# Patient Record
Sex: FEMALE | Race: WHITE | NOT HISPANIC OR LATINO | Employment: OTHER | URBAN - METROPOLITAN AREA
[De-identification: names, ages, dates, MRNs, and addresses within clinical notes are randomized per-mention and may not be internally consistent; named-entity substitution may affect disease eponyms.]

---

## 2017-09-12 ENCOUNTER — HISTORICAL (OUTPATIENT)
Dept: RADIOLOGY | Facility: HOSPITAL | Age: 62
End: 2017-09-12

## 2020-08-31 ENCOUNTER — HISTORICAL (OUTPATIENT)
Dept: RADIOLOGY | Facility: HOSPITAL | Age: 65
End: 2020-08-31

## 2022-05-19 NOTE — PROGRESS NOTES
Subjective:       Patient ID: Helen Stephen is a 66 y.o. female.    Chief Complaint: No chief complaint on file.      Past Medical History:   Diagnosis Date    DM (diabetes mellitus)     HTN (hypertension)     Pulmonary nodules      No past surgical history on file.  No family history on file.  Social History     Socioeconomic History    Marital status:        No current outpatient medications on file.     No current facility-administered medications for this visit.     Review of patient's allergies indicates:  No Known Allergies    Review of Systems    Objective:      There were no vitals filed for this visit.  Physical Exam    Lab Review: CBC: No results found for: WBC, RBC, HGB, HCT, PLT  Diagnostics Review: ECG: Reviewed     Assessment:       No diagnosis found.    Plan:

## 2022-05-19 NOTE — PROGRESS NOTES
History & Physical    CHIEF COMPLAINT:  RIGHT BREAST MUCINOUS CARCINOMA     History of Present Illness:  66 year-old-female referred by Dr. Sang Rodriguez who underwent routine screening mammogram showing a 1.0 cm focal asymmetry at 12 o'clock, 4 cm from the nipple, in the right breast suspicious for malignancy.  Biopsy of the mass was performed, pathology revealed mucinous carcinoma, low grade.      Past Medical History:   Diagnosis Date    DM (diabetes mellitus)     HTN (hypertension)     Pulmonary nodules      No past surgical history on file.     No family history on file.      ALLERGIES:  No known allergies    MEDICATIONS:  Enalapril  Premarin  Xanax  Metformin    Review of Systems:  Review of Systems   Constitutional: Negative for appetite change, chills, diaphoresis and fever.   HENT: Negative for congestion, drooling, ear discharge, ear pain and hearing loss.    Eyes: Negative for discharge.   Respiratory: Negative for apnea, cough, choking, chest tightness, shortness of breath and stridor.    Cardiovascular: Negative for chest pain, palpitations and leg swelling.   Endocrine: Negative for cold intolerance and heat intolerance.   Genitourinary: Negative for difficulty urinating, dyspareunia, dysuria and hematuria.   Musculoskeletal: Negative for arthralgias, gait problem and joint swelling.   Skin: Negative for color change and rash.   Neurological: Negative for dizziness, tremors, seizures, syncope, facial asymmetry, speech difficulty, light-headedness, numbness and headaches.   Psychiatric/Behavioral: Negative for agitation and confusion.       OBJECTIVE:     Vital Signs (Most Recent)              Physical Exam:  Physical Exam  Vitals and nursing note reviewed. Exam conducted with a chaperone present.   Constitutional:       General: She is not in acute distress.     Appearance: Normal appearance. She is not ill-appearing, toxic-appearing or diaphoretic.   HENT:      Head: Normocephalic and atraumatic.       Nose: Nose normal.      Mouth/Throat:      Mouth: Mucous membranes are moist.      Pharynx: Oropharynx is clear.   Eyes:      General: No scleral icterus.     Extraocular Movements: Extraocular movements intact.      Pupils: Pupils are equal, round, and reactive to light.   Neck:      Vascular: No carotid bruit.   Cardiovascular:      Rate and Rhythm: Normal rate and regular rhythm.      Pulses: Normal pulses.      Heart sounds: Normal heart sounds. No murmur heard.  Pulmonary:      Effort: Pulmonary effort is normal.      Breath sounds: Normal breath sounds. No stridor. No wheezing.   Chest:   Breasts:      Right: Normal. No swelling, bleeding, inverted nipple, mass, nipple discharge, skin change, tenderness, axillary adenopathy or supraclavicular adenopathy.      Left: Normal. No swelling, bleeding, inverted nipple, mass, nipple discharge, skin change, tenderness, axillary adenopathy or supraclavicular adenopathy.       Abdominal:      General: Abdomen is flat. Bowel sounds are normal. There is no distension.      Palpations: Abdomen is soft. There is no mass.      Tenderness: There is no abdominal tenderness. There is no right CVA tenderness, left CVA tenderness, guarding or rebound.      Hernia: No hernia is present.   Musculoskeletal:         General: No swelling, tenderness, deformity or signs of injury. Normal range of motion.      Cervical back: Normal range of motion and neck supple. No rigidity or tenderness.      Right lower leg: No edema.      Left lower leg: No edema.   Lymphadenopathy:      Cervical: No cervical adenopathy.      Upper Body:      Right upper body: No supraclavicular or axillary adenopathy.      Left upper body: No supraclavicular or axillary adenopathy.   Skin:     General: Skin is warm.      Capillary Refill: Capillary refill takes less than 2 seconds.      Coloration: Skin is not jaundiced or pale.      Findings: No bruising, erythema, lesion or rash.   Neurological:       General: No focal deficit present.      Mental Status: She is alert and oriented to person, place, and time.      Sensory: No sensory deficit.      Motor: No weakness.      Coordination: Coordination normal.      Gait: Gait normal.   Psychiatric:         Mood and Affect: Mood normal.         Behavior: Behavior normal.         Judgment: Judgment normal.             ASSESSMENT/PLAN:     Diagnosis, staging, prognosis and treatment guidelines for breast cancer were discussed with the patient and her  in detail.Using visual aids and drawings, I described the anatomy and potential surgical procedures.  She is interested in breast conservation therapy is a good candidate for this.  Schedule for ultrasound-guided right breast lumpectomy, sentinel lymph node mapping and biopsy.The risks and benefits of the procedure were explained in detail, questions were addressed, the patient gives consent to proceed

## 2022-05-20 ENCOUNTER — OFFICE VISIT (OUTPATIENT)
Dept: SURGICAL ONCOLOGY | Facility: CLINIC | Age: 67
End: 2022-05-20
Payer: MEDICAID

## 2022-05-20 VITALS
BODY MASS INDEX: 35.02 KG/M2 | HEART RATE: 73 BPM | WEIGHT: 197.63 LBS | SYSTOLIC BLOOD PRESSURE: 139 MMHG | DIASTOLIC BLOOD PRESSURE: 67 MMHG | HEIGHT: 63 IN

## 2022-05-20 DIAGNOSIS — C50.911 MUCINOUS CARCINOMA OF RIGHT BREAST: Primary | ICD-10-CM

## 2022-05-20 DIAGNOSIS — C50.911 MUCINOUS CARCINOMA OF RIGHT BREAST: ICD-10-CM

## 2022-05-20 DIAGNOSIS — C50.411 MALIGNANT NEOPLASM OF UPPER-OUTER QUADRANT OF RIGHT FEMALE BREAST, UNSPECIFIED ESTROGEN RECEPTOR STATUS: Primary | ICD-10-CM

## 2022-05-20 PROCEDURE — 3075F SYST BP GE 130 - 139MM HG: CPT | Mod: CPTII,,, | Performed by: SURGERY

## 2022-05-20 PROCEDURE — 99213 OFFICE O/P EST LOW 20 MIN: CPT | Mod: PBBFAC | Performed by: SURGERY

## 2022-05-20 PROCEDURE — 3078F PR MOST RECENT DIASTOLIC BLOOD PRESSURE < 80 MM HG: ICD-10-PCS | Mod: CPTII,,, | Performed by: SURGERY

## 2022-05-20 PROCEDURE — 3075F PR MOST RECENT SYSTOLIC BLOOD PRESS GE 130-139MM HG: ICD-10-PCS | Mod: CPTII,,, | Performed by: SURGERY

## 2022-05-20 PROCEDURE — 1159F PR MEDICATION LIST DOCUMENTED IN MEDICAL RECORD: ICD-10-PCS | Mod: CPTII,,, | Performed by: SURGERY

## 2022-05-20 PROCEDURE — 99205 PR OFFICE/OUTPT VISIT, NEW, LEVL V, 60-74 MIN: ICD-10-PCS | Mod: S$PBB,,, | Performed by: SURGERY

## 2022-05-20 PROCEDURE — 4010F ACE/ARB THERAPY RXD/TAKEN: CPT | Mod: CPTII,,, | Performed by: SURGERY

## 2022-05-20 PROCEDURE — 3078F DIAST BP <80 MM HG: CPT | Mod: CPTII,,, | Performed by: SURGERY

## 2022-05-20 PROCEDURE — 3008F BODY MASS INDEX DOCD: CPT | Mod: CPTII,,, | Performed by: SURGERY

## 2022-05-20 PROCEDURE — 3008F PR BODY MASS INDEX (BMI) DOCUMENTED: ICD-10-PCS | Mod: CPTII,,, | Performed by: SURGERY

## 2022-05-20 PROCEDURE — 99999 PR PBB SHADOW E&M-EST. PATIENT-LVL III: ICD-10-PCS | Mod: PBBFAC,,, | Performed by: SURGERY

## 2022-05-20 PROCEDURE — 4010F PR ACE/ARB THEARPY RXD/TAKEN: ICD-10-PCS | Mod: CPTII,,, | Performed by: SURGERY

## 2022-05-20 PROCEDURE — 99999 PR PBB SHADOW E&M-EST. PATIENT-LVL III: CPT | Mod: PBBFAC,,, | Performed by: SURGERY

## 2022-05-20 PROCEDURE — 99205 OFFICE O/P NEW HI 60 MIN: CPT | Mod: S$PBB,,, | Performed by: SURGERY

## 2022-05-20 PROCEDURE — 1159F MED LIST DOCD IN RCRD: CPT | Mod: CPTII,,, | Performed by: SURGERY

## 2022-05-20 RX ORDER — ASPIRIN 81 MG/1
81 TABLET ORAL DAILY
COMMUNITY

## 2022-05-20 RX ORDER — ESTRADIOL 1 MG/1
1 TABLET ORAL DAILY
COMMUNITY
Start: 2022-04-05 | End: 2022-09-01

## 2022-05-20 RX ORDER — AMLODIPINE AND BENAZEPRIL HYDROCHLORIDE 10; 20 MG/1; MG/1
1 CAPSULE ORAL DAILY
COMMUNITY
Start: 2022-03-21

## 2022-05-20 RX ORDER — PIOGLITAZONEHYDROCHLORIDE 30 MG/1
30 TABLET ORAL DAILY
COMMUNITY
Start: 2022-03-06

## 2022-05-20 RX ORDER — SITAGLIPTIN AND METFORMIN HYDROCHLORIDE 1000; 50 MG/1; MG/1
1 TABLET, FILM COATED ORAL 2 TIMES DAILY
COMMUNITY
Start: 2022-03-06

## 2022-05-20 RX ORDER — GLIMEPIRIDE 4 MG/1
4 TABLET ORAL 2 TIMES DAILY
COMMUNITY
Start: 2022-04-08

## 2022-05-20 RX ORDER — ENOXAPARIN SODIUM 100 MG/ML
30 INJECTION SUBCUTANEOUS EVERY 24 HOURS
Status: CANCELLED | OUTPATIENT
Start: 2022-05-20

## 2022-05-20 RX ORDER — ALPRAZOLAM 0.5 MG/1
0.5 TABLET ORAL 2 TIMES DAILY
COMMUNITY
Start: 2022-03-15

## 2022-05-20 RX ORDER — LEVOTHYROXINE SODIUM 50 UG/1
50 TABLET ORAL DAILY
COMMUNITY
Start: 2022-03-03

## 2022-05-20 RX ORDER — ATENOLOL 50 MG/1
50 TABLET ORAL NIGHTLY
COMMUNITY
Start: 2022-03-03

## 2022-05-20 RX ORDER — SIMVASTATIN 40 MG/1
40 TABLET, FILM COATED ORAL DAILY
COMMUNITY
Start: 2022-03-03

## 2022-05-24 ENCOUNTER — HOSPITAL ENCOUNTER (OUTPATIENT)
Dept: RADIOLOGY | Facility: HOSPITAL | Age: 67
Discharge: HOME | End: 2022-05-24
Attending: SURGERY
Payer: MEDICAID

## 2022-05-24 DIAGNOSIS — C50.911 MUCINOUS CARCINOMA OF RIGHT BREAST: ICD-10-CM

## 2022-05-24 DIAGNOSIS — C50.911 MUCINOUS CARCINOMA OF RIGHT BREAST: Primary | ICD-10-CM

## 2022-05-24 PROCEDURE — 71046 X-RAY EXAM CHEST 2 VIEWS: CPT | Mod: TC

## 2022-05-25 ENCOUNTER — DOCUMENTATION ONLY (OUTPATIENT)
Dept: SURGICAL ONCOLOGY | Facility: CLINIC | Age: 67
End: 2022-05-25
Payer: MEDICAID

## 2022-05-25 NOTE — PROGRESS NOTES
Referral faxed manually to [facility] for ABN Pre Op EKG. They are to contact patient with appointment date and time.    P# [phone]  F# [phone]

## 2022-05-25 NOTE — PROGRESS NOTES
[facility] called and stated that they do not take patient's insurance. Referral faxed manually to [facility]. They are to contact patient with appointment date and time.    P# [phone]  F# [phone]

## 2022-06-01 DIAGNOSIS — C50.919 BREAST CANCER: ICD-10-CM

## 2022-06-01 DIAGNOSIS — C50.911 MUCINOUS CARCINOMA OF RIGHT BREAST: Primary | ICD-10-CM

## 2022-06-01 RX ORDER — ENOXAPARIN SODIUM 100 MG/ML
30 INJECTION SUBCUTANEOUS EVERY 24 HOURS
Status: CANCELLED | OUTPATIENT
Start: 2022-06-01

## 2022-06-05 RX ORDER — CHOLECALCIFEROL (VITAMIN D3) 25 MCG
1000 TABLET ORAL DAILY
COMMUNITY

## 2022-06-07 ENCOUNTER — ANESTHESIA EVENT (OUTPATIENT)
Dept: SURGERY | Facility: HOSPITAL | Age: 67
End: 2022-06-07
Payer: MEDICAID

## 2022-06-07 DIAGNOSIS — C50.911 MUCINOUS CARCINOMA OF RIGHT BREAST: Primary | ICD-10-CM

## 2022-06-08 ENCOUNTER — HOSPITAL ENCOUNTER (OUTPATIENT)
Dept: RADIOLOGY | Facility: HOSPITAL | Age: 67
Discharge: HOME | End: 2022-06-08
Payer: MEDICAID

## 2022-06-08 ENCOUNTER — HOSPITAL ENCOUNTER (OUTPATIENT)
Facility: HOSPITAL | Age: 67
Discharge: HOME | End: 2022-06-08
Attending: INTERNAL MEDICINE | Admitting: INTERNAL MEDICINE
Payer: MEDICAID

## 2022-06-08 ENCOUNTER — ANESTHESIA (OUTPATIENT)
Dept: SURGERY | Facility: HOSPITAL | Age: 67
End: 2022-06-08
Payer: MEDICAID

## 2022-06-08 VITALS
HEART RATE: 57 BPM | HEIGHT: 63 IN | TEMPERATURE: 97 F | WEIGHT: 197.75 LBS | RESPIRATION RATE: 16 BRPM | BODY MASS INDEX: 35.04 KG/M2 | DIASTOLIC BLOOD PRESSURE: 63 MMHG | SYSTOLIC BLOOD PRESSURE: 115 MMHG | OXYGEN SATURATION: 96 %

## 2022-06-08 DIAGNOSIS — C50.919 BREAST CANCER: ICD-10-CM

## 2022-06-08 DIAGNOSIS — C50.911 MUCINOUS CARCINOMA OF RIGHT BREAST: ICD-10-CM

## 2022-06-08 LAB — POCT GLUCOSE: 210 MG/DL (ref 70–110)

## 2022-06-08 PROCEDURE — 71000033 HC RECOVERY, INTIAL HOUR: Performed by: SURGERY

## 2022-06-08 PROCEDURE — 36000706: Performed by: SURGERY

## 2022-06-08 PROCEDURE — A9541 TC99M SULFUR COLLOID: HCPCS

## 2022-06-08 PROCEDURE — 71000015 HC POSTOP RECOV 1ST HR: Performed by: SURGERY

## 2022-06-08 PROCEDURE — 19301 PR MASTECTOMY, PARTIAL: ICD-10-PCS | Mod: RT,,, | Performed by: SURGERY

## 2022-06-08 PROCEDURE — 37000009 HC ANESTHESIA EA ADD 15 MINS: Performed by: SURGERY

## 2022-06-08 PROCEDURE — 37000008 HC ANESTHESIA 1ST 15 MINUTES: Performed by: SURGERY

## 2022-06-08 PROCEDURE — 25000003 PHARM REV CODE 250: Performed by: NURSE ANESTHETIST, CERTIFIED REGISTERED

## 2022-06-08 PROCEDURE — 71000016 HC POSTOP RECOV ADDL HR: Performed by: SURGERY

## 2022-06-08 PROCEDURE — 38900 PR INTRAOPERATIVE SENTINEL LYMPH NODE ID W DYE INJECTION: ICD-10-PCS | Mod: RT,,, | Performed by: SURGERY

## 2022-06-08 PROCEDURE — 63600175 PHARM REV CODE 636 W HCPCS: Performed by: ANESTHESIOLOGY

## 2022-06-08 PROCEDURE — 63600175 PHARM REV CODE 636 W HCPCS: Performed by: NURSE ANESTHETIST, CERTIFIED REGISTERED

## 2022-06-08 PROCEDURE — 19301 PARTIAL MASTECTOMY: CPT | Mod: RT,,, | Performed by: SURGERY

## 2022-06-08 PROCEDURE — 38525 PR BIOPSY/REM LYMPH NODES, AXILLARY: ICD-10-PCS | Mod: 51,RT,, | Performed by: SURGERY

## 2022-06-08 PROCEDURE — 01610 ANES ALL PX NRV MUSC SHO&AX: CPT | Performed by: SURGERY

## 2022-06-08 PROCEDURE — 36000707: Performed by: SURGERY

## 2022-06-08 PROCEDURE — 82962 GLUCOSE BLOOD TEST: CPT | Performed by: SURGERY

## 2022-06-08 PROCEDURE — 27201423 OPTIME MED/SURG SUP & DEVICES STERILE SUPPLY: Performed by: SURGERY

## 2022-06-08 PROCEDURE — 25000003 PHARM REV CODE 250: Performed by: SURGERY

## 2022-06-08 PROCEDURE — 76998 PR  ULTRASONIC GUIDANCE, INTRAOPERATIVE: ICD-10-PCS | Mod: 26,,, | Performed by: SURGERY

## 2022-06-08 PROCEDURE — 38792 PR IDENTIFY SENTINEL 2DE: ICD-10-PCS | Mod: 59,RT,, | Performed by: SURGERY

## 2022-06-08 PROCEDURE — 38792 RA TRACER ID OF SENTINL NODE: CPT | Mod: 59,RT,, | Performed by: SURGERY

## 2022-06-08 PROCEDURE — 76998 US GUIDE INTRAOP: CPT | Mod: 26,,, | Performed by: SURGERY

## 2022-06-08 PROCEDURE — 38525 BIOPSY/REMOVAL LYMPH NODES: CPT | Mod: 51,RT,, | Performed by: SURGERY

## 2022-06-08 PROCEDURE — 38900 IO MAP OF SENT LYMPH NODE: CPT | Mod: RT,,, | Performed by: SURGERY

## 2022-06-08 PROCEDURE — 63600175 PHARM REV CODE 636 W HCPCS: Performed by: SURGERY

## 2022-06-08 PROCEDURE — 63600175 PHARM REV CODE 636 W HCPCS

## 2022-06-08 RX ORDER — SODIUM CHLORIDE, SODIUM LACTATE, POTASSIUM CHLORIDE, CALCIUM CHLORIDE 600; 310; 30; 20 MG/100ML; MG/100ML; MG/100ML; MG/100ML
INJECTION, SOLUTION INTRAVENOUS CONTINUOUS
Status: DISCONTINUED | OUTPATIENT
Start: 2022-06-08 | End: 2022-06-08 | Stop reason: HOSPADM

## 2022-06-08 RX ORDER — DIPHENHYDRAMINE HYDROCHLORIDE 50 MG/ML
12.5 INJECTION INTRAMUSCULAR; INTRAVENOUS ONCE
Status: DISCONTINUED | OUTPATIENT
Start: 2022-06-08 | End: 2022-06-08 | Stop reason: HOSPADM

## 2022-06-08 RX ORDER — HYDROCODONE BITARTRATE AND ACETAMINOPHEN 5; 325 MG/1; MG/1
1 TABLET ORAL EVERY 6 HOURS PRN
Qty: 18 TABLET | Refills: 0 | Status: SHIPPED | OUTPATIENT
Start: 2022-06-08

## 2022-06-08 RX ORDER — FENTANYL CITRATE 50 UG/ML
INJECTION, SOLUTION INTRAMUSCULAR; INTRAVENOUS
Status: DISCONTINUED | OUTPATIENT
Start: 2022-06-08 | End: 2022-06-08

## 2022-06-08 RX ORDER — SODIUM CHLORIDE, SODIUM GLUCONATE, SODIUM ACETATE, POTASSIUM CHLORIDE AND MAGNESIUM CHLORIDE 30; 37; 368; 526; 502 MG/100ML; MG/100ML; MG/100ML; MG/100ML; MG/100ML
1000 INJECTION, SOLUTION INTRAVENOUS CONTINUOUS
Status: DISCONTINUED | OUTPATIENT
Start: 2022-06-08 | End: 2022-06-08 | Stop reason: HOSPADM

## 2022-06-08 RX ORDER — BUPIVACAINE HYDROCHLORIDE 2.5 MG/ML
INJECTION, SOLUTION EPIDURAL; INFILTRATION; INTRACAUDAL
Status: DISCONTINUED
Start: 2022-06-08 | End: 2022-06-08 | Stop reason: HOSPADM

## 2022-06-08 RX ORDER — LIDOCAINE HYDROCHLORIDE 10 MG/ML
INJECTION, SOLUTION EPIDURAL; INFILTRATION; INTRACAUDAL; PERINEURAL
Status: DISCONTINUED | OUTPATIENT
Start: 2022-06-08 | End: 2022-06-08

## 2022-06-08 RX ORDER — DIPHENHYDRAMINE HYDROCHLORIDE 50 MG/ML
INJECTION INTRAMUSCULAR; INTRAVENOUS
Status: DISCONTINUED
Start: 2022-06-08 | End: 2022-06-08 | Stop reason: HOSPADM

## 2022-06-08 RX ORDER — CEFAZOLIN SODIUM 1 G/3ML
INJECTION, POWDER, FOR SOLUTION INTRAMUSCULAR; INTRAVENOUS
Status: COMPLETED
Start: 2022-06-08 | End: 2022-06-08

## 2022-06-08 RX ORDER — ONDANSETRON 2 MG/ML
4 INJECTION INTRAMUSCULAR; INTRAVENOUS DAILY PRN
Status: DISCONTINUED | OUTPATIENT
Start: 2022-06-08 | End: 2022-06-08 | Stop reason: HOSPADM

## 2022-06-08 RX ORDER — PROPOFOL 10 MG/ML
VIAL (ML) INTRAVENOUS
Status: DISCONTINUED | OUTPATIENT
Start: 2022-06-08 | End: 2022-06-08

## 2022-06-08 RX ORDER — MIDAZOLAM HYDROCHLORIDE 1 MG/ML
2 INJECTION INTRAMUSCULAR; INTRAVENOUS ONCE AS NEEDED
Status: COMPLETED | OUTPATIENT
Start: 2022-06-08 | End: 2022-06-08

## 2022-06-08 RX ORDER — CEFAZOLIN SODIUM 2 G/50ML
2 SOLUTION INTRAVENOUS
Status: DISCONTINUED | OUTPATIENT
Start: 2022-06-08 | End: 2022-06-08 | Stop reason: HOSPADM

## 2022-06-08 RX ORDER — ENOXAPARIN SODIUM 100 MG/ML
30 INJECTION SUBCUTANEOUS EVERY 24 HOURS
Status: DISCONTINUED | OUTPATIENT
Start: 2022-06-08 | End: 2022-06-08 | Stop reason: HOSPADM

## 2022-06-08 RX ORDER — BUPIVACAINE HYDROCHLORIDE 2.5 MG/ML
INJECTION, SOLUTION EPIDURAL; INFILTRATION; INTRACAUDAL
Status: DISCONTINUED | OUTPATIENT
Start: 2022-06-08 | End: 2022-06-08 | Stop reason: HOSPADM

## 2022-06-08 RX ORDER — MIDAZOLAM HYDROCHLORIDE 1 MG/ML
INJECTION INTRAMUSCULAR; INTRAVENOUS
Status: COMPLETED
Start: 2022-06-08 | End: 2022-06-08

## 2022-06-08 RX ORDER — ISOSULFAN BLUE 50 MG/5ML
INJECTION, SOLUTION SUBCUTANEOUS
Status: DISCONTINUED | OUTPATIENT
Start: 2022-06-08 | End: 2022-06-08 | Stop reason: HOSPADM

## 2022-06-08 RX ORDER — HYDROMORPHONE HYDROCHLORIDE 2 MG/ML
0.4 INJECTION, SOLUTION INTRAMUSCULAR; INTRAVENOUS; SUBCUTANEOUS EVERY 5 MIN PRN
Status: DISCONTINUED | OUTPATIENT
Start: 2022-06-08 | End: 2022-06-08 | Stop reason: HOSPADM

## 2022-06-08 RX ORDER — ACETAMINOPHEN 10 MG/ML
INJECTION, SOLUTION INTRAVENOUS
Status: DISCONTINUED | OUTPATIENT
Start: 2022-06-08 | End: 2022-06-08

## 2022-06-08 RX ORDER — ISOSULFAN BLUE 50 MG/5ML
INJECTION, SOLUTION SUBCUTANEOUS
Status: DISCONTINUED
Start: 2022-06-08 | End: 2022-06-08 | Stop reason: HOSPADM

## 2022-06-08 RX ORDER — ENOXAPARIN SODIUM 100 MG/ML
INJECTION SUBCUTANEOUS
Status: DISCONTINUED
Start: 2022-06-08 | End: 2022-06-08 | Stop reason: HOSPADM

## 2022-06-08 RX ORDER — MEPERIDINE HYDROCHLORIDE 25 MG/ML
12.5 INJECTION INTRAMUSCULAR; INTRAVENOUS; SUBCUTANEOUS ONCE AS NEEDED
Status: DISCONTINUED | OUTPATIENT
Start: 2022-06-08 | End: 2022-06-08 | Stop reason: HOSPADM

## 2022-06-08 RX ORDER — BUPIVACAINE HYDROCHLORIDE 2.5 MG/ML
INJECTION, SOLUTION EPIDURAL; INFILTRATION; INTRACAUDAL
Status: DISCONTINUED
Start: 2022-06-08 | End: 2022-06-08 | Stop reason: WASHOUT

## 2022-06-08 RX ORDER — CEFAZOLIN SODIUM 1 G/3ML
INJECTION, POWDER, FOR SOLUTION INTRAMUSCULAR; INTRAVENOUS
Status: DISCONTINUED | OUTPATIENT
Start: 2022-06-08 | End: 2022-06-08

## 2022-06-08 RX ADMIN — HYDROMORPHONE HYDROCHLORIDE 0.4 MG: 2 INJECTION, SOLUTION INTRAMUSCULAR; INTRAVENOUS; SUBCUTANEOUS at 09:06

## 2022-06-08 RX ADMIN — MIDAZOLAM HYDROCHLORIDE 2 MG: 1 INJECTION INTRAMUSCULAR; INTRAVENOUS at 07:06

## 2022-06-08 RX ADMIN — FENTANYL CITRATE 50 MCG: 50 INJECTION, SOLUTION INTRAMUSCULAR; INTRAVENOUS at 09:06

## 2022-06-08 RX ADMIN — SODIUM CHLORIDE, POTASSIUM CHLORIDE, SODIUM LACTATE AND CALCIUM CHLORIDE: 600; 310; 30; 20 INJECTION, SOLUTION INTRAVENOUS at 07:06

## 2022-06-08 RX ADMIN — ACETAMINOPHEN 1000 MG: 10 INJECTION, SOLUTION INTRAVENOUS at 08:06

## 2022-06-08 RX ADMIN — LIDOCAINE HYDROCHLORIDE 20 MG: 10 INJECTION, SOLUTION EPIDURAL; INFILTRATION; INTRACAUDAL; PERINEURAL at 08:06

## 2022-06-08 RX ADMIN — FENTANYL CITRATE 50 MCG: 50 INJECTION, SOLUTION INTRAMUSCULAR; INTRAVENOUS at 08:06

## 2022-06-08 RX ADMIN — PROPOFOL 150 MG: 10 INJECTION, EMULSION INTRAVENOUS at 08:06

## 2022-06-08 RX ADMIN — MIDAZOLAM HYDROCHLORIDE 2 MG: 1 INJECTION, SOLUTION INTRAMUSCULAR; INTRAVENOUS at 07:06

## 2022-06-08 RX ADMIN — CEFAZOLIN 2 G: 330 INJECTION, POWDER, FOR SOLUTION INTRAMUSCULAR; INTRAVENOUS at 07:06

## 2022-06-08 RX ADMIN — ONDANSETRON 4 MG: 2 INJECTION INTRAMUSCULAR; INTRAVENOUS at 09:06

## 2022-06-08 RX ADMIN — ENOXAPARIN SODIUM 30 MG: 30 INJECTION SUBCUTANEOUS at 06:06

## 2022-06-08 RX ADMIN — SODIUM CHLORIDE, POTASSIUM CHLORIDE, SODIUM LACTATE AND CALCIUM CHLORIDE: 600; 310; 30; 20 INJECTION, SOLUTION INTRAVENOUS at 08:06

## 2022-06-08 NOTE — OP NOTE
Dictation Op Note    Date of Surgery:  June 8, 2022    Surgeon:  Rony Osman MD    Assistant:  DIANE Faulkner                                         Pre-op Diagnosis:  Right breast cancer, 12 o'clock position    Post-op Diagnosis:  Same    Procedures:    1. Right breast Ultrasound-guided wire localization and lumpectomy  2. Right deep axillary sentinel lymph node excision/biopsy  3. sentinel lymph node mapping with neoprobe  4. injection of 1 mCi of radioactive sulfur colloid for sentinel lymph node mapping  5. injection of 3 cc of Lymphazurin blue dye for sentinel lymph node mapping   6. Re-excision of the medial, posterior/deep, and superior margins     Anesthesia:  GETA    EBL:  Less than 25 cc    Findings:  Intraoperative ultrasound was used to identify the previously placed marking clip and mass at the 12 o'clock position as well as perform intraoperative ultrasound guidance.  A representative image was saved in the media section of the EMR.  Four Burnham nodes were identified which were blue and hot. Margin Map was used and Specimen radiograph/ultrasound confirmed excision of the marking clip, and the superior, medial and posterior/deep margin were reexcised, otherwise adequate margins circumferentially.     Procedure in detail: After informed consent was obtained the patient was brought to the operating room placed in supine position.  General endotracheal anesthesia was administered without difficulty.  The patient's right breast was prepped and draped in sterile fashion.  I injected 1 mCi of radioactive sulfur colloid in the areolar dermis for sentinel lymph node mapping with neoprobe, injected 3 cc of Lymphazurin blue dye in the subareolar tissue for sentinel lymph node mapping.  The breast and axilla were prepped and draped in sterile fashion.  Using the neoprobe an area of increased radioactivity was noted.  Incision was made over this area carried down through the subcutaneous tissues and fascia,  into the deep axilla using the Bovie cautery.  Following blue stained lymphatic channels and using the neoprobe 4 deep axillary sentinel nodes were identified which were blue and hot.  .  Attention was then turned towards the breast.  Focused breast ultrasound was performed and the mass with associated marking clip were identified.  Under ultrasound guidance I performed wire localization.  Incision was made and the area around the wire was excised circumferentially.  The specimen was marked with suture long lateral and short superior. Margin Map was used and a specimen radiograph was performed in the operating room.  Based on this, the superior, medial and posterior margins were reexcised to 1 cm additional gross margins.  there were adequate radiographic margins circumferentially.  The wounds were irrigated with antimicrobial solution after meticulous hemostasis was achieved and closed in multiple layers with absorbable suture sterile dressings were applied.  The patient tolerated the procedure well.      Rony Osman MD  Surgical Oncology  Complex General, Gastrointestinal and Hepatobiliary Surgery

## 2022-06-08 NOTE — DISCHARGE INSTRUCTIONS
Patient Education       Lumpectomy Discharge Instructions   About this topic   Lumpectomy is the removal of a lump from your breast. The doctor removes only a part of the breast. The lab tests the lump to see if it is cancer. Your doctor will also take a small portion of healthy tissue around the lump. This is to make sure that all cancer or other abnormal tissue is removed. It is part of a treatment for early stage breast cancer.     What care is needed at home?   Wear a bra that gives you good support.  Incision care:  Be sure to wash your hands before and after touching your wound or dressing.  Remove bandage in 48 hours  You may take a shower in 48 hours. Wash your hands, wash your incision with soap and water, pat dry, and leave open to air.   No creams, oil, or lotions on incision.   No lifting or pulling anything over 10 pounds (4.5 kg)  Ask your doctor when it's okay to go back to normal activities  Place an ice pack wrapped in a towel over the painful part. Never put ice right on the skin. Do not leave the ice on more than 10 to 15 minutes at a time.   What follow-up care is needed?   Be sure to keep follow up visit.  Your doctor will talk with you about the tissue test result. Together you can make a plan if more care is needed.  If you have stitches or a drain, you will need to have them taken out. Your doctor will often want to do this in 1 to 2 weeks.  What drugs may be needed?   The doctor may order drugs to:  Help with pain  Prevent infection  Will physical activity be limited?   Rest is important. You need to avoid playing sports, swimming, and heavy lifting for a week or so after the surgery. Talk to your doctor about the right amount of activity for you.  What problems could happen?   Infection  Bleeding  Bruising  Scarring  Nerve damage  Cancer may come back  When do I need to call the doctor?   Signs of infection. These include a fever of 100.4°F (38°C) or higher, chills, cough, more sputum or  change in color of sputum, pain with passing urine, wound that will not heal, or pain.  Signs of wound infection. These include swelling, redness, warmth around the wound; too much pain when touched; yellowish, greenish, or bloody discharge; foul smell coming from the cut site; cut site opens up.  Very bad upset stomach and throwing up   Pain is not controlled by your medication

## 2022-06-08 NOTE — PROGRESS NOTES
Worsening of rash to left hand, some reddened area noted to chest.Dr Christianson notified, new order for benadryl given.

## 2022-06-08 NOTE — ANESTHESIA PROCEDURE NOTES
Intubation    Date/Time: 6/8/2022 8:03 AM  Performed by: Geri Lyons CRNA  Authorized by: Keshawn Christianson MD     Intubation:     Induction:  Intravenous    Intubated:  Postinduction    Mask Ventilation:  Easy with oral airway    Attempts:  1    Attempted By:  CRNA    Difficult Airway Encountered?: No      Airway Device:  Supraglottic airway/LMA    Airway Device Size:  4.0    Style/Cuff Inflation:  Cuffed (inflated to minimal occlusive pressure)    Placement Verified By:  Capnometry    Complicating Factors:  None    Findings Post-Intubation:  BS equal bilateral

## 2022-06-08 NOTE — PLAN OF CARE
VSS, viyk score   10   , pt arousable and ready for transfer to [facility] per Dr Christianson.

## 2022-06-08 NOTE — PLAN OF CARE
Problem: Fall Injury Risk  Goal: Absence of Fall and Fall-Related Injury  Outcome: Ongoing, Progressing  Intervention: Identify and Manage Contributors  Flowsheets (Taken 6/8/2022 1041)  Self-Care Promotion: independence encouraged  Intervention: Promote Injury-Free Environment  Flowsheets (Taken 6/8/2022 1041)  Safety Promotion/Fall Prevention:   assistive device/personal item within reach   Fall Risk reviewed with patient/family   family to remain at bedside   side rails raised x 2   supervised activity   instructed to call staff for mobility     Problem: Pain Acute  Goal: Acceptable Pain Control and Functional Ability  Outcome: Met  Intervention: Develop Pain Management Plan  Flowsheets (Taken 6/8/2022 1041)  Pain Management Interventions:   care clustered   position adjusted   quiet environment facilitated  Intervention: Prevent or Manage Pain  Flowsheets (Taken 6/8/2022 1041)  Sleep/Rest Enhancement:   awakenings minimized   family presence promoted   noise level reduced   room darkened  Sensory Stimulation Regulation:   auditory stimulation minimized   quiet environment promoted   visual stimulation minimized   care clustered  Bowel Elimination Promotion:   adequate fluid intake promoted   ambulation promoted

## 2022-06-08 NOTE — ANESTHESIA PREPROCEDURE EVALUATION
"                                                                                                             06/08/2022  Helen Stephen is a 66 y.o., female   Pre-operative evaluation for Procedure(s) (LRB):  ULTRASOUND GUIDED RIGHT BREAST LUMPECTOMY, WIRE LOC, SN BIOPSY, right (Right)    BP (!) 146/70   Pulse 63   Ht 5' 3" (1.6 m)   Wt 89.4 kg (197 lb)   LMP  (LMP Unknown) Comment: post menopausal  SpO2 98%   Breastfeeding No   BMI 34.90 kg/m²     There is no problem list on file for this patient.      Review of patient's allergies indicates:   Allergen Reactions    Adhesive tape-silicones        Current Outpatient Medications   Medication Instructions    ALPRAZolam (XANAX) 0.5 mg, Oral, 2 times daily    amlodipine-benazepril 10-20mg (LOTREL) 10-20 mg per capsule 1 capsule, Oral, Daily    aspirin (ECOTRIN) 81 mg, Oral, Daily    atenoloL (TENORMIN) 50 mg, Oral, Nightly    estradioL (ESTRACE) 1 mg, Oral, Daily    glimepiride (AMARYL) 4 mg, Oral, 2 times daily    JANUMET 50-1,000 mg per tablet 1 tablet, Oral, 2 times daily    levothyroxine (SYNTHROID) 50 mcg, Oral, Daily    pioglitazone (ACTOS) 30 mg, Oral, Daily    simvastatin (ZOCOR) 40 mg, Oral, Daily    vitamin D (VITAMIN D3) 1,000 Units, Oral, Daily       Past Surgical History:   Procedure Laterality Date    CHOLECYSTECTOMY      HYSTERECTOMY         Social History     Socioeconomic History    Marital status:    Tobacco Use    Smoking status: Never Smoker    Smokeless tobacco: Never Used   Substance and Sexual Activity    Alcohol use: Never    Drug use: Never       Lab Results   Component Value Date    WBC 12.6 (H) 05/24/2022    HGB 11.1 (L) 05/24/2022    HCT 38.3 05/24/2022    MCV 65.9 (L) 05/24/2022     05/24/2022          BMP  Lab Results   Component Value Date     05/24/2022    K 4.5 05/24/2022    CO2 27 05/24/2022    BUN 15.0 05/24/2022    CREATININE 0.73 05/24/2022    CALCIUM 9.6 05/24/2022    EGFRNONAA >60 " 05/24/2022          INR  No results for input(s): PT, INR, PROTIME, APTT in the last 72 hours.    No results found for: PREGTESTUR, PREGSERUM, HCG, HCGQUANT       Diagnostic Studies:      EKG:  Results for orders placed or performed in visit on 05/24/22   EKG 12-lead    Collection Time: 05/24/22  8:36 AM    Narrative    Test Reason : C50.911,    Vent. Rate : 065 BPM     Atrial Rate : 065 BPM     P-R Int : 224 ms          QRS Dur : 078 ms      QT Int : 432 ms       P-R-T Axes : 100 000 047 degrees     QTc Int : 449 ms    Sinus rhythm with 1st degree A-V block  Low voltage QRS  Abnormal ECG  Confirmed by Kayla Ragsdale MD ([id number]) on 5/24/2022 11:17:58 AM    Referred By: CLAIRE HIGHTOWER           Confirmed By:Kayla Ragsdale MD       2D Echo:  No results found for this or any previous visit..      Pre-op Assessment          Review of Systems  Anesthesia Hx:  No problems with previous Anesthesia  Denies Family Hx of Anesthesia complications.  Personal Hx of Anesthesia complications, Post-Operative Nausea/Vomiting   Cardiovascular:  Cardiovascular Normal  No Cardiac Complaints   Pulmonary:  Pulmonary Normal No Pulmonary Complaints   Hepatic/GI:   No Current GERD Sx       Physical Exam  General: Alert and Oriented  Obese  Airway:  Mallampati: III   Mouth Opening: Normal  TM Distance: Normal  Tongue: Normal  Neck ROM: Normal ROM    Dental:  Intact    Chest/Lungs:  Clear to auscultation, Normal Respiratory Rate    Heart:  Rate: Normal  Rhythm: Regular Rhythm        Anesthesia Plan  Type of Anesthesia, risks & benefits discussed:    Anesthesia Type: Gen Supraglottic Airway  Intra-op Monitoring Plan: Standard ASA Monitors  Post Op Pain Control Plan: multimodal analgesia  Induction:  IV and Inhalation  Airway Plan: Direct  Informed Consent: Informed consent signed with the Patient and all parties understand the risks and agree with anesthesia plan.  All questions answered. Patient consented to blood products? Yes  ASA Score:  2  Day of Surgery Review of History & Physical: H&P Update referred to the surgeon/provider.  Anesthesia Plan Notes: Disc possible need to convert to GETA    Discussed Anesthetic Plan w/ Pt/Family. Questions Entertained. Accepted.      Ready For Surgery From Anesthesia Perspective.     .

## 2022-06-08 NOTE — ANESTHESIA POSTPROCEDURE EVALUATION
Anesthesia Post Evaluation    Patient: Helen Stephen    Procedure(s) Performed: Procedure(s) (LRB):  ULTRASOUND GUIDED RIGHT BREAST LUMPECTOMY, WIRE LOC, SN BIOPSY, right (Right)    Final Anesthesia Type: general      Patient location during evaluation: floor  Patient participation: Yes- Able to Participate  Level of consciousness: awake  Post-procedure vital signs: reviewed and stable  Pain management: adequate  Airway patency: patent    PONV status at discharge: vomiting (controlled) and nausea (inadequately controlled)  Anesthetic complications: no      Cardiovascular status: hemodynamically stable  Respiratory status: spontaneous ventilation and unassisted  Hydration status: euvolemic  Follow-up not needed.  Comments:              Vitals Value Taken Time   /63 06/08/22 1115   Temp 36.2 °C (97.2 °F) 06/08/22 0930   Pulse 57 06/08/22 1115   Resp 16 06/08/22 1015   SpO2 96 % 06/08/22 1115         Event Time   Out of Recovery 10:12:15         Pain/Devonte Score: Pain Rating Prior to Med Admin: 5 (6/8/2022  9:50 AM)  Devonte Score: 10 (6/8/2022 10:15 AM)  Modified Devonte Score: 20 (6/8/2022 11:22 AM)

## 2022-06-08 NOTE — TRANSFER OF CARE
"Anesthesia Transfer of Care Note    Patient: Helen Stephen    Procedure(s) Performed: Procedure(s) (LRB):  ULTRASOUND GUIDED RIGHT BREAST LUMPECTOMY, WIRE LOC, SN BIOPSY, right (Right)    Patient location: PACU    Anesthesia Type: general    Transport from OR: Transported from OR on room air with adequate spontaneous ventilation    Post pain: adequate analgesia    Post assessment: no apparent anesthetic complications    Post vital signs: stable    Level of consciousness: responds to stimulation    Nausea/Vomiting: no nausea/vomiting    Complications: none    Transfer of care protocol was followed      Last vitals:   Visit Vitals  /60   Pulse 60   Temp 36.2 °C (97.2 °F)   Resp 15   Ht 5' 3" (1.6 m)   Wt 89.7 kg (197 lb 12 oz)   LMP  (LMP Unknown)   SpO2 97%   Breastfeeding No   BMI 35.03 kg/m²     "

## 2022-06-08 NOTE — PROGRESS NOTES
Pt c/o itching to right hand. Some redness noted, not raised. No other sites noted. No distress noted.

## 2022-06-08 NOTE — DISCHARGE SUMMARY
[facility] Surgical - Periop Services  Discharge Note  Short Stay    Procedure(s) (LRB):  ULTRASOUND GUIDED RIGHT BREAST LUMPECTOMY, WIRE LOC, SN BIOPSY, right (Right)    OUTCOME: Patient tolerated treatment/procedure well without complication and is now ready for discharge.    DISPOSITION: Home or Self Care    FINAL DIAGNOSIS:  <principal problem not specified>    FOLLOWUP: In clinic    DISCHARGE INSTRUCTIONS:  No discharge procedures on file.     TIME SPENT ON DISCHARGE:  minutes

## 2022-06-09 LAB — POCT GLUCOSE: 179 MG/DL (ref 70–110)

## 2022-06-15 DIAGNOSIS — C50.911 MUCINOUS CARCINOMA OF RIGHT BREAST: Primary | ICD-10-CM

## 2022-06-17 DIAGNOSIS — C50.911 MUCINOUS CARCINOMA OF RIGHT BREAST: Primary | ICD-10-CM

## 2022-06-21 ENCOUNTER — OFFICE VISIT (OUTPATIENT)
Dept: SURGICAL ONCOLOGY | Facility: CLINIC | Age: 67
End: 2022-06-21
Payer: MEDICAID

## 2022-06-21 DIAGNOSIS — N63.0 BREAST MASS: ICD-10-CM

## 2022-06-21 DIAGNOSIS — C50.911 MALIGNANT NEOPLASM OF RIGHT FEMALE BREAST, UNSPECIFIED ESTROGEN RECEPTOR STATUS, UNSPECIFIED SITE OF BREAST: Primary | ICD-10-CM

## 2022-06-21 PROCEDURE — 99212 OFFICE O/P EST SF 10 MIN: CPT | Mod: PBBFAC | Performed by: NURSE PRACTITIONER

## 2022-06-21 PROCEDURE — 99999 PR PBB SHADOW E&M-EST. PATIENT-LVL II: ICD-10-PCS | Mod: PBBFAC,,,

## 2022-06-21 PROCEDURE — 4010F PR ACE/ARB THEARPY RXD/TAKEN: ICD-10-PCS | Mod: CPTII,,, | Performed by: NURSE PRACTITIONER

## 2022-06-21 PROCEDURE — 1159F PR MEDICATION LIST DOCUMENTED IN MEDICAL RECORD: ICD-10-PCS | Mod: CPTII,,, | Performed by: NURSE PRACTITIONER

## 2022-06-21 PROCEDURE — 1159F MED LIST DOCD IN RCRD: CPT | Mod: CPTII,,, | Performed by: NURSE PRACTITIONER

## 2022-06-21 PROCEDURE — 99024 POSTOP FOLLOW-UP VISIT: CPT | Mod: ,,, | Performed by: NURSE PRACTITIONER

## 2022-06-21 PROCEDURE — 4010F ACE/ARB THERAPY RXD/TAKEN: CPT | Mod: CPTII,,, | Performed by: NURSE PRACTITIONER

## 2022-06-21 PROCEDURE — 99024 PR POST-OP FOLLOW-UP VISIT: ICD-10-PCS | Mod: ,,, | Performed by: NURSE PRACTITIONER

## 2022-06-21 PROCEDURE — 99999 PR PBB SHADOW E&M-EST. PATIENT-LVL II: CPT | Mod: PBBFAC,,,

## 2022-06-21 NOTE — PROGRESS NOTES
POST OP RIGHT LUMPECTOMY WITH SN BIOPSY  REPORTS REDNESS RIGHT AXILLARY INCISION AND LOW GRADE TEMP    PT OVERALL DOING WELL  REPORTS NOTICED SOME REDNESS 2 DAYS AGO  NO CHILLS BUT REPORTS 100.5 TEMP    BREAST INCISION HEALING WELL, NO SIGNS OF INFECTION  MILD REDNESS RIGHT AXILLARY INCISION, SMALL SEROMA PALPATED, NO GROSS CELLULITIS, NOT HOT, NO DRAINAGE    PATH: 0.8CM INV MUCINOUS CA, CLEAR MARGINS, NEG NODES  DR HIGHTOWER REVIEWS PATH    PATH DISCUSSED WITH PT AND QUESTIONS ANSWERED  WILL MAKE MED ONC AND RAD ONC REFERRAL  WILL CALL IN BACTRIM BID FOR 7 DAYS  RIGHT SIDED MAMMO 6 MONTHS [facility]CA    PT WILL CALL IN 2 DAYS TO LET US KNOW HOW INCISION IS DOING  SHE WILL CALL OR RETURN IF NO IMPROVEMENT    OTHERWISE RTC 6 MONTHS

## 2022-06-28 ENCOUNTER — OFFICE VISIT (OUTPATIENT)
Dept: SURGICAL ONCOLOGY | Facility: CLINIC | Age: 67
End: 2022-06-28
Payer: MEDICAID

## 2022-06-28 DIAGNOSIS — C50.911 MALIGNANT NEOPLASM OF RIGHT FEMALE BREAST, UNSPECIFIED ESTROGEN RECEPTOR STATUS, UNSPECIFIED SITE OF BREAST: Primary | ICD-10-CM

## 2022-06-28 DIAGNOSIS — T14.8XXA SKIN WOUND FROM SURGICAL INCISION: ICD-10-CM

## 2022-06-28 DIAGNOSIS — T14.8XXA SKIN WOUND FROM SURGICAL INCISION: Primary | ICD-10-CM

## 2022-06-28 PROCEDURE — 1159F MED LIST DOCD IN RCRD: CPT | Mod: CPTII,,, | Performed by: NURSE PRACTITIONER

## 2022-06-28 PROCEDURE — 87205 SMEAR GRAM STAIN: CPT | Performed by: NURSE PRACTITIONER

## 2022-06-28 PROCEDURE — 99212 OFFICE O/P EST SF 10 MIN: CPT | Mod: PBBFAC | Performed by: NURSE PRACTITIONER

## 2022-06-28 PROCEDURE — 1159F PR MEDICATION LIST DOCUMENTED IN MEDICAL RECORD: ICD-10-PCS | Mod: CPTII,,, | Performed by: NURSE PRACTITIONER

## 2022-06-28 PROCEDURE — 4010F PR ACE/ARB THEARPY RXD/TAKEN: ICD-10-PCS | Mod: CPTII,,, | Performed by: NURSE PRACTITIONER

## 2022-06-28 PROCEDURE — 99024 PR POST-OP FOLLOW-UP VISIT: ICD-10-PCS | Mod: ,,, | Performed by: NURSE PRACTITIONER

## 2022-06-28 PROCEDURE — 99999 PR PBB SHADOW E&M-EST. PATIENT-LVL II: ICD-10-PCS | Mod: PBBFAC,,,

## 2022-06-28 PROCEDURE — 87184 SC STD DISK METHOD PER PLATE: CPT | Performed by: NURSE PRACTITIONER

## 2022-06-28 PROCEDURE — 4010F ACE/ARB THERAPY RXD/TAKEN: CPT | Mod: CPTII,,, | Performed by: NURSE PRACTITIONER

## 2022-06-28 PROCEDURE — 87077 CULTURE AEROBIC IDENTIFY: CPT | Performed by: NURSE PRACTITIONER

## 2022-06-28 PROCEDURE — 99024 POSTOP FOLLOW-UP VISIT: CPT | Mod: ,,, | Performed by: NURSE PRACTITIONER

## 2022-06-28 PROCEDURE — 99999 PR PBB SHADOW E&M-EST. PATIENT-LVL II: CPT | Mod: PBBFAC,,,

## 2022-06-28 PROCEDURE — 87075 CULTR BACTERIA EXCEPT BLOOD: CPT | Performed by: NURSE PRACTITIONER

## 2022-06-28 NOTE — PROGRESS NOTES
FOLLOW UP RIGHT AXILLARY REDNESS    PT REPORTS STILL WITH SOME REDNESS  NOW REPORTS SOME CLEARISH DRAINAGE  NO COMPLAINTS OF FEVER OR CHILLS  USING WARM COMPRESSES  COMPLETED BACTRIM TODAY    RIGHT AXILLA STILL WITH SOME REDNESS  SLIGHTLY WARM TO TOUCH  NO ABEL DRAINAGE NOTED  APPROX 20CC SLIGHTLY MURKY FLUID ASPIRATED  PT TOLERATED WELL    WILL SEND FLUID FOR CULTURE  BEGIN ON CLINDAMYCIN TID    SHE WILL RETURN IN 2 DAYS FOR RECHECK

## 2022-06-29 LAB
GRAM STN SPEC: NORMAL
GRAM STN SPEC: NORMAL

## 2022-06-30 ENCOUNTER — OFFICE VISIT (OUTPATIENT)
Dept: HEMATOLOGY/ONCOLOGY | Facility: CLINIC | Age: 67
End: 2022-06-30
Payer: MEDICAID

## 2022-06-30 VITALS
SYSTOLIC BLOOD PRESSURE: 131 MMHG | TEMPERATURE: 98 F | HEART RATE: 60 BPM | HEIGHT: 63 IN | RESPIRATION RATE: 18 BRPM | BODY MASS INDEX: 34.76 KG/M2 | WEIGHT: 196.19 LBS | DIASTOLIC BLOOD PRESSURE: 75 MMHG

## 2022-06-30 DIAGNOSIS — C50.911 MUCINOUS CARCINOMA OF RIGHT BREAST: Primary | ICD-10-CM

## 2022-06-30 DIAGNOSIS — Z17.0 ER+ (ESTROGEN RECEPTOR POSITIVE STATUS): ICD-10-CM

## 2022-06-30 DIAGNOSIS — D05.11 DUCTAL CARCINOMA IN SITU (DCIS) OF RIGHT BREAST: ICD-10-CM

## 2022-06-30 DIAGNOSIS — M19.90 ARTHRITIS: ICD-10-CM

## 2022-06-30 PROCEDURE — 99205 OFFICE O/P NEW HI 60 MIN: CPT | Mod: S$PBB,,, | Performed by: INTERNAL MEDICINE

## 2022-06-30 PROCEDURE — 99999 PR PBB SHADOW E&M-EST. PATIENT-LVL IV: ICD-10-PCS | Mod: PBBFAC,,, | Performed by: INTERNAL MEDICINE

## 2022-06-30 PROCEDURE — 3075F PR MOST RECENT SYSTOLIC BLOOD PRESS GE 130-139MM HG: ICD-10-PCS | Mod: CPTII,,, | Performed by: INTERNAL MEDICINE

## 2022-06-30 PROCEDURE — 4010F PR ACE/ARB THEARPY RXD/TAKEN: ICD-10-PCS | Mod: CPTII,,, | Performed by: INTERNAL MEDICINE

## 2022-06-30 PROCEDURE — 99214 OFFICE O/P EST MOD 30 MIN: CPT | Mod: PBBFAC | Performed by: INTERNAL MEDICINE

## 2022-06-30 PROCEDURE — 3008F BODY MASS INDEX DOCD: CPT | Mod: CPTII,,, | Performed by: INTERNAL MEDICINE

## 2022-06-30 PROCEDURE — 1159F MED LIST DOCD IN RCRD: CPT | Mod: CPTII,,, | Performed by: INTERNAL MEDICINE

## 2022-06-30 PROCEDURE — 3078F DIAST BP <80 MM HG: CPT | Mod: CPTII,,, | Performed by: INTERNAL MEDICINE

## 2022-06-30 PROCEDURE — 4010F ACE/ARB THERAPY RXD/TAKEN: CPT | Mod: CPTII,,, | Performed by: INTERNAL MEDICINE

## 2022-06-30 PROCEDURE — 99999 PR PBB SHADOW E&M-EST. PATIENT-LVL IV: CPT | Mod: PBBFAC,,, | Performed by: INTERNAL MEDICINE

## 2022-06-30 PROCEDURE — 99205 PR OFFICE/OUTPT VISIT, NEW, LEVL V, 60-74 MIN: ICD-10-PCS | Mod: S$PBB,,, | Performed by: INTERNAL MEDICINE

## 2022-06-30 PROCEDURE — 3078F PR MOST RECENT DIASTOLIC BLOOD PRESSURE < 80 MM HG: ICD-10-PCS | Mod: CPTII,,, | Performed by: INTERNAL MEDICINE

## 2022-06-30 PROCEDURE — 3008F PR BODY MASS INDEX (BMI) DOCUMENTED: ICD-10-PCS | Mod: CPTII,,, | Performed by: INTERNAL MEDICINE

## 2022-06-30 PROCEDURE — 1159F PR MEDICATION LIST DOCUMENTED IN MEDICAL RECORD: ICD-10-PCS | Mod: CPTII,,, | Performed by: INTERNAL MEDICINE

## 2022-06-30 PROCEDURE — 3075F SYST BP GE 130 - 139MM HG: CPT | Mod: CPTII,,, | Performed by: INTERNAL MEDICINE

## 2022-06-30 NOTE — PROGRESS NOTES
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 RIGHT BREAST LUMPECTOMY:     INVASIVE MUCINOUS CARCINOMA, LOYOLA-PHILLIPS GRADE 1 (0.8 CM).     CARCINOMA IS 1 MM FROM THE SUPERIOR MARGIN (NEW SUPERIOR MARGIN CLEAR, SEE SPECIMEN #3).     MINIMAL DUCTAL CARCINOMA IN SITU, LOW GRADE.           TUMOR        Histologic Type: Mucinous carcinoma        Histologic Grade ([place] Histologic Score): [place] Score        Glandular (Acinar) / Tubular Differentiation: Score 1 (greater than 75% of tumor area forming glandular / tubular structures)        Nuclear Pleomorphism: Score 1 (Nuclei small with little increase in size in comparison with normal breast epithelial cells, regular outlines, uniform nuclear chromatin, little variation in size)        Mitotic Rate: Score 1        Overall Grade: Grade 1 (scores of 3, 4 or 5)        Tumor Size: Greatest dimension of largest invasive focus greater than 1 mm (specify exact measurement in Millimeters (mm)): 8 mm        Tumor Focality: Single focus of invasive carcinoma        Ductal Carcinoma In Situ (DCIS): Present        Ductal Carcinoma In Situ (DCIS): Negative for extensive intraductal component (EIC)        Nuclear Grade: Grade I (low)        Lymphovascular Invasion: Not identified        Dermal Lymphovascular Invasion: No skin present        Treatment Effect in the Breast: No known presurgical therapy     MARGINS        Margin Status for Invasive Carcinoma: All margins negative for invasive carcinoma        Distance from Invasive Carcinoma to Closest Margin: Greater than: 10 mm         Closest Margin(s) to Invasive Carcinoma: Superior        Margin Status for DCIS: All margins negative for DCIS        Distance from DCIS to Closest Margin: Greater than: 10 mm        Closest Margin(s) to DCIS: Superior     REGIONAL LYMPH NODES        Regional Lymph Node Status: Regional lymph nodes present             All regional lymph nodes negative for tumor        Total Number of Lymph Nodes Examined (sentinel and non-sentinel): Exact number : 10        Number of [place] Nodes Examined: Exact number : 4          Subjective:   Interval History: Ms. Stephen is a 66 y.o. female presented for routine mammogram screening showed 1.0 cm focal asymetry at 12 o'clock, 4 cm from the nipple in the right breast suspicious for malignancy. Biopsy of mass performed, pathology revealed mucinous carcinoma, low grade. Next mammogram scheduled for 12/12/22  She had a ultrasound guided lumpectomy on 6/8/2022.    Patient is here to discuss adjuvant systemic therapy. Still has drainage with dressing on right axillary area s/p SLN biopsy site. Currently on antibiotics and followed by breast surgeon Dr. Rony Osman. Pt will see Rad Onc Dr. Villarreal on 7/12/22 to discuss adjuvant XRT.     Past Medical History:   Past Medical History:   Diagnosis Date    Arthritis     Breast cancer     Disorder of thyroid, unspecified     DM (diabetes mellitus)     Generalized anxiety disorder     HTN (hypertension)     Mixed hyperlipidemia     PONV (postoperative nausea and vomiting)     Pulmonary nodules     Thalassemia minor        Past Surgical HIstory:   Past Surgical History:   Procedure Laterality Date    CHOLECYSTECTOMY      HYSTERECTOMY         Family History:   Family History   Problem Relation Age of Onset    Dementia Mother     Cancer Father     Thalassemia Sister     Diabetes Mellitus Brother     Asthma Brother        Social History:  reports that she has never smoked. She has never used smokeless tobacco. She reports that she  does not drink alcohol and does not use drugs.    Allergies:  Review of patient's allergies indicates:   Allergen Reactions    Adhesive tape-silicones        Medications:  Current Outpatient Medications   Medication Sig Dispense Refill    ALPRAZolam (XANAX) 0.5 MG tablet Take 0.5 mg by mouth 2 (two) times daily.      amlodipine-benazepril 10-20mg (LOTREL) 10-20 mg per capsule Take 1 capsule by mouth once daily.      aspirin (ECOTRIN) 81 MG EC tablet Take 81 mg by mouth once daily.      atenoloL (TENORMIN) 50 MG tablet Take 50 mg by mouth nightly.      clindamycin (CLEOCIN) 300 MG capsule Take 1 capsule (300 mg total) by mouth every 8 (eight) hours. 21 capsule 1    estradioL (ESTRACE) 1 MG tablet Take 1 mg by mouth once daily.      glimepiride (AMARYL) 4 MG tablet Take 4 mg by mouth 2 (two) times daily.      HYDROcodone-acetaminophen (NORCO) 5-325 mg per tablet Take 1 tablet by mouth every 6 (six) hours as needed for Pain. 18 tablet 0    JANUMET 50-1,000 mg per tablet Take 1 tablet by mouth 2 (two) times daily.      levothyroxine (SYNTHROID) 50 MCG tablet Take 50 mcg by mouth once daily.      pioglitazone (ACTOS) 30 MG tablet Take 30 mg by mouth once daily.      simvastatin (ZOCOR) 40 MG tablet Take 40 mg by mouth once daily.      vitamin D (VITAMIN D3) 1000 units Tab Take 1,000 Units by mouth once daily.       No current facility-administered medications for this visit.       Review of Systems   Constitutional: Negative.    HENT: Negative.    Eyes: Negative.    Respiratory: Negative.    Cardiovascular: Negative.    Gastrointestinal: Negative.    Endocrine: Negative.    Genitourinary: Negative.    Musculoskeletal: Negative.    Skin: Positive for wound.        Mild redness on right breast and right axillary  surgical areas.   Allergic/Immunologic: Negative.    Neurological: Negative.    Hematological: Negative.  Does not bruise/bleed easily.   Psychiatric/Behavioral: Negative.    All other systems  "reviewed and are negative.      ECOG Performance Status:   ECOG SCORE           Objective:      Vitals:   Vitals:    06/30/22 1148   BP: 131/75   BP Location: Left arm   Patient Position: Sitting   BP Method: Medium (Automatic)   Pulse: 60   Resp: 18   Temp: 97.9 °F (36.6 °C)   TempSrc: Oral   Weight: 89 kg (196 lb 3.2 oz)   Height: 5' 2.99" (1.6 m)     BMI: Body mass index is 34.76 kg/m².    Physical Exam  Vitals and nursing note reviewed.   Constitutional:       General: She is not in acute distress.     Appearance: Normal appearance.   HENT:      Head: Normocephalic and atraumatic.      Nose: Nose normal.      Mouth/Throat:      Mouth: Mucous membranes are moist.   Eyes:      Conjunctiva/sclera: Conjunctivae normal.   Cardiovascular:      Rate and Rhythm: Normal rate and regular rhythm.      Pulses: Normal pulses.      Heart sounds: Normal heart sounds.   Pulmonary:      Effort: Pulmonary effort is normal.      Breath sounds: Normal breath sounds.   Chest:      Comments: S/p right lumpectomy and right SLN biopsy - mild erythema and tenderness on palpation.   Wound dressing on right axillary areas.  No mass palpable on left breast or left axilla.  Abdominal:      General: There is no distension.      Palpations: Abdomen is soft.      Tenderness: There is no abdominal tenderness.   Musculoskeletal:         General: No swelling or tenderness.      Cervical back: Neck supple. No tenderness.      Right lower leg: No edema.      Left lower leg: No edema.   Lymphadenopathy:      Cervical: No cervical adenopathy.   Skin:     General: Skin is warm.   Neurological:      General: No focal deficit present.      Mental Status: She is alert and oriented to person, place, and time. Mental status is at baseline.         Laboratory Data:  Office Visit on 06/28/2022   Component Date Value Ref Range Status    Anaerobe Culture 06/28/2022 No Anaerobes Isolated   Preliminary    Body Fluid Culture 06/28/2022 No Growth At 48 Hours   " Preliminary    GRAM STAIN 06/28/2022 Few WBC observed   Final    GRAM STAIN 06/28/2022 No bacteria seen    Final         Imaging:     EXAMINATION:  XR CHEST PA AND LATERAL     CLINICAL HISTORY:  pre op breast;, Malignant neoplasm of unspecified site of right female breast.     FINDINGS:  No alveolar consolidation, effusion, or pneumothorax is seen.   The thoracic aorta is ectatic and calcified, but otherwise the  cardiac silhouette, central pulmonary vessels and mediastinum are normal in size and are grossly unremarkable. Except for degenerative changes, the  visualized osseous structures are grossly unremarkable.     Impression:     No acute chest disease is identified.        Electronically signed by: Tereso Menjivar  Date:                                            05/24/2022  Time:                                           08:46  Assessment:     1. 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  Patient less likely would need chemotherapy treatment based on her tumor biology.   Would go ahead ordering Oncotype DX.  Pt will see Rad Onc Dr. Villarreal on 7/12/22 to discuss adjuvant XRT.           Plan:  RTC 2 months with MD with DEXSIRI, radiation therapy progress, and labs  Stop Estradiol  Send Oncotype DX  Start Letrozole after radiation  Will order DEXA  CBC, CMP, CEA, Ca 15-3, Ca 27-29       Problem List Items Addressed This Visit    None     Visit Diagnoses     Mucinous carcinoma of right breast

## 2022-07-01 LAB — BACTERIA SPEC ANAEROBE CULT: NORMAL

## 2022-07-03 LAB — BACTERIA FLD CULT: ABNORMAL

## 2022-07-05 ENCOUNTER — OFFICE VISIT (OUTPATIENT)
Dept: SURGICAL ONCOLOGY | Facility: CLINIC | Age: 67
End: 2022-07-05
Payer: MEDICAID

## 2022-07-05 VITALS
SYSTOLIC BLOOD PRESSURE: 133 MMHG | HEIGHT: 63 IN | HEART RATE: 74 BPM | WEIGHT: 201 LBS | BODY MASS INDEX: 35.61 KG/M2 | DIASTOLIC BLOOD PRESSURE: 73 MMHG

## 2022-07-05 DIAGNOSIS — T14.8XXA SKIN WOUND FROM SURGICAL INCISION: Primary | ICD-10-CM

## 2022-07-05 PROCEDURE — 3075F SYST BP GE 130 - 139MM HG: CPT | Mod: CPTII,,, | Performed by: SURGERY

## 2022-07-05 PROCEDURE — 99999 PR PBB SHADOW E&M-EST. PATIENT-LVL III: CPT | Mod: PBBFAC,,, | Performed by: SURGERY

## 2022-07-05 PROCEDURE — 4010F PR ACE/ARB THEARPY RXD/TAKEN: ICD-10-PCS | Mod: CPTII,,, | Performed by: SURGERY

## 2022-07-05 PROCEDURE — 3078F DIAST BP <80 MM HG: CPT | Mod: CPTII,,, | Performed by: SURGERY

## 2022-07-05 PROCEDURE — 3078F PR MOST RECENT DIASTOLIC BLOOD PRESSURE < 80 MM HG: ICD-10-PCS | Mod: CPTII,,, | Performed by: SURGERY

## 2022-07-05 PROCEDURE — 1159F MED LIST DOCD IN RCRD: CPT | Mod: CPTII,,, | Performed by: SURGERY

## 2022-07-05 PROCEDURE — 3008F BODY MASS INDEX DOCD: CPT | Mod: CPTII,,, | Performed by: SURGERY

## 2022-07-05 PROCEDURE — 1159F PR MEDICATION LIST DOCUMENTED IN MEDICAL RECORD: ICD-10-PCS | Mod: CPTII,,, | Performed by: SURGERY

## 2022-07-05 PROCEDURE — 99999 PR PBB SHADOW E&M-EST. PATIENT-LVL III: ICD-10-PCS | Mod: PBBFAC,,, | Performed by: SURGERY

## 2022-07-05 PROCEDURE — 3075F PR MOST RECENT SYSTOLIC BLOOD PRESS GE 130-139MM HG: ICD-10-PCS | Mod: CPTII,,, | Performed by: SURGERY

## 2022-07-05 PROCEDURE — 4010F ACE/ARB THERAPY RXD/TAKEN: CPT | Mod: CPTII,,, | Performed by: SURGERY

## 2022-07-05 PROCEDURE — 99024 POSTOP FOLLOW-UP VISIT: CPT | Mod: ,,, | Performed by: SURGERY

## 2022-07-05 PROCEDURE — 3008F PR BODY MASS INDEX (BMI) DOCUMENTED: ICD-10-PCS | Mod: CPTII,,, | Performed by: SURGERY

## 2022-07-05 PROCEDURE — 99213 OFFICE O/P EST LOW 20 MIN: CPT | Mod: PBBFAC | Performed by: SURGERY

## 2022-07-05 PROCEDURE — 99024 PR POST-OP FOLLOW-UP VISIT: ICD-10-PCS | Mod: ,,, | Performed by: SURGERY

## 2022-07-05 NOTE — PROGRESS NOTES
Patient returns for right axillary wound check  She denies fevers or chills  She reports some mild serous drainage from the wound  Mild erythema  She has completed a week of clindamycin orally    On exam she has mild surrounding erythema of the axillary wound, a small area of thin skin bridging was opened and serous fluid drained, no evidence of purulence.    Continue 1 for a week of oral clindamycin  Return to clinic in 1 week

## 2022-07-12 ENCOUNTER — OFFICE VISIT (OUTPATIENT)
Dept: RADIATION THERAPY | Facility: HOSPITAL | Age: 67
End: 2022-07-12
Attending: RADIOLOGY
Payer: MEDICAID

## 2022-07-12 ENCOUNTER — OFFICE VISIT (OUTPATIENT)
Dept: SURGICAL ONCOLOGY | Facility: CLINIC | Age: 67
End: 2022-07-12
Payer: MEDICAID

## 2022-07-12 DIAGNOSIS — C50.911 MALIGNANT NEOPLASM OF RIGHT FEMALE BREAST, UNSPECIFIED ESTROGEN RECEPTOR STATUS, UNSPECIFIED SITE OF BREAST: Primary | ICD-10-CM

## 2022-07-12 DIAGNOSIS — C50.911 MALIGNANT NEOPLASM OF RIGHT FEMALE BREAST, UNSPECIFIED ESTROGEN RECEPTOR STATUS, UNSPECIFIED SITE OF BREAST: ICD-10-CM

## 2022-07-12 PROCEDURE — 99999 PR PBB SHADOW E&M-EST. PATIENT-LVL III: CPT | Mod: PBBFAC,,,

## 2022-07-12 PROCEDURE — 99999 PR PBB SHADOW E&M-EST. PATIENT-LVL III: ICD-10-PCS | Mod: PBBFAC,,,

## 2022-07-12 PROCEDURE — 1159F MED LIST DOCD IN RCRD: CPT | Mod: CPTII,,, | Performed by: NURSE PRACTITIONER

## 2022-07-12 PROCEDURE — 99213 OFFICE O/P EST LOW 20 MIN: CPT | Mod: PBBFAC | Performed by: NURSE PRACTITIONER

## 2022-07-12 PROCEDURE — 4010F ACE/ARB THERAPY RXD/TAKEN: CPT | Mod: CPTII,,, | Performed by: NURSE PRACTITIONER

## 2022-07-12 PROCEDURE — 99024 POSTOP FOLLOW-UP VISIT: CPT | Mod: ,,, | Performed by: NURSE PRACTITIONER

## 2022-07-12 PROCEDURE — 99024 PR POST-OP FOLLOW-UP VISIT: ICD-10-PCS | Mod: ,,, | Performed by: NURSE PRACTITIONER

## 2022-07-12 PROCEDURE — 4010F PR ACE/ARB THEARPY RXD/TAKEN: ICD-10-PCS | Mod: CPTII,,, | Performed by: NURSE PRACTITIONER

## 2022-07-12 PROCEDURE — 77290 THER RAD SIMULAJ FIELD CPLX: CPT | Performed by: RADIOLOGY

## 2022-07-12 PROCEDURE — 1159F PR MEDICATION LIST DOCUMENTED IN MEDICAL RECORD: ICD-10-PCS | Mod: CPTII,,, | Performed by: NURSE PRACTITIONER

## 2022-07-12 NOTE — PROGRESS NOTES
RIGHT AXILLARY INCISION RECHECK    REDNESS RIGHT AXILLA ALMOST COMPLETELY RESOLVED  INCISION CLOSED AND HEALING   WILL FINISH AB THIS WEEK    NO FEVER OR CHILLS    HAS ESTABLISHED APPT WITH MED ONC, RAD ONC, 6 MONTHS MAMMO, FU HERE IN 6 MONTHS, ALL ALREADY ARRANGED PREVIOUSLY    SHE WILL CALL WITH ANY PROBLEMS

## 2022-07-19 LAB
DHEA SERPL-MCNC: NORMAL
ESTRIOL SERPL-MCNC: NORMAL NG/ML
ESTROGEN SERPL-MCNC: NORMAL PG/ML
INSULIN SERPL-ACNC: NORMAL U[IU]/ML
LAB AP CLINICAL INFORMATION: NORMAL
LAB AP GROSS DESCRIPTION: NORMAL
LAB AP REPORT FOOTNOTES: NORMAL
T3RU NFR SERPL: NORMAL %

## 2022-07-26 PROCEDURE — 77334 RADIATION TREATMENT AID(S): CPT | Performed by: RADIOLOGY

## 2022-08-01 PROCEDURE — 77334 RADIATION TREATMENT AID(S): CPT | Performed by: RADIOLOGY

## 2022-08-01 PROCEDURE — 77300 RADIATION THERAPY DOSE PLAN: CPT | Performed by: RADIOLOGY

## 2022-08-02 PROCEDURE — 77295 3-D RADIOTHERAPY PLAN: CPT | Performed by: RADIOLOGY

## 2022-08-03 ENCOUNTER — APPOINTMENT (OUTPATIENT)
Dept: RADIATION THERAPY | Facility: HOSPITAL | Age: 67
End: 2022-08-03
Attending: RADIOLOGY
Payer: MEDICAID

## 2022-08-03 PROCEDURE — 77412 RADIATION TX DELIVERY LVL 3: CPT | Performed by: RADIOLOGY

## 2022-08-03 PROCEDURE — 77387 GUIDANCE FOR RADJ TX DLVR: CPT | Performed by: RADIOLOGY

## 2022-08-04 PROCEDURE — 77412 RADIATION TX DELIVERY LVL 3: CPT | Performed by: RADIOLOGY

## 2022-08-04 PROCEDURE — 77387 GUIDANCE FOR RADJ TX DLVR: CPT | Performed by: RADIOLOGY

## 2022-08-05 PROCEDURE — 77412 RADIATION TX DELIVERY LVL 3: CPT | Performed by: RADIOLOGY

## 2022-08-05 PROCEDURE — 77387 GUIDANCE FOR RADJ TX DLVR: CPT | Performed by: RADIOLOGY

## 2022-08-08 PROCEDURE — 77412 RADIATION TX DELIVERY LVL 3: CPT | Performed by: RADIOLOGY

## 2022-08-08 PROCEDURE — 77336 RADIATION PHYSICS CONSULT: CPT | Performed by: RADIOLOGY

## 2022-08-08 PROCEDURE — 77387 GUIDANCE FOR RADJ TX DLVR: CPT | Performed by: RADIOLOGY

## 2022-08-09 PROCEDURE — 77412 RADIATION TX DELIVERY LVL 3: CPT | Performed by: RADIOLOGY

## 2022-08-09 PROCEDURE — 77387 GUIDANCE FOR RADJ TX DLVR: CPT | Performed by: RADIOLOGY

## 2022-08-10 PROCEDURE — 77412 RADIATION TX DELIVERY LVL 3: CPT | Performed by: RADIOLOGY

## 2022-08-10 PROCEDURE — 77387 GUIDANCE FOR RADJ TX DLVR: CPT | Performed by: RADIOLOGY

## 2022-08-11 PROCEDURE — 77387 GUIDANCE FOR RADJ TX DLVR: CPT | Performed by: RADIOLOGY

## 2022-08-11 PROCEDURE — 77412 RADIATION TX DELIVERY LVL 3: CPT | Performed by: RADIOLOGY

## 2022-08-12 PROCEDURE — 77412 RADIATION TX DELIVERY LVL 3: CPT | Performed by: RADIOLOGY

## 2022-08-12 PROCEDURE — 77387 GUIDANCE FOR RADJ TX DLVR: CPT | Performed by: RADIOLOGY

## 2022-08-15 PROCEDURE — 77387 GUIDANCE FOR RADJ TX DLVR: CPT | Performed by: RADIOLOGY

## 2022-08-15 PROCEDURE — 77336 RADIATION PHYSICS CONSULT: CPT | Performed by: RADIOLOGY

## 2022-08-15 PROCEDURE — 77412 RADIATION TX DELIVERY LVL 3: CPT | Performed by: RADIOLOGY

## 2022-08-16 PROCEDURE — 77387 GUIDANCE FOR RADJ TX DLVR: CPT | Performed by: RADIOLOGY

## 2022-08-16 PROCEDURE — 77412 RADIATION TX DELIVERY LVL 3: CPT | Performed by: RADIOLOGY

## 2022-08-17 PROCEDURE — 77412 RADIATION TX DELIVERY LVL 3: CPT | Performed by: RADIOLOGY

## 2022-08-17 PROCEDURE — 77387 GUIDANCE FOR RADJ TX DLVR: CPT | Performed by: RADIOLOGY

## 2022-08-18 PROCEDURE — 77387 GUIDANCE FOR RADJ TX DLVR: CPT | Performed by: RADIOLOGY

## 2022-08-18 PROCEDURE — 77412 RADIATION TX DELIVERY LVL 3: CPT | Performed by: RADIOLOGY

## 2022-08-19 PROCEDURE — 77387 GUIDANCE FOR RADJ TX DLVR: CPT | Performed by: RADIOLOGY

## 2022-08-19 PROCEDURE — 77412 RADIATION TX DELIVERY LVL 3: CPT | Performed by: RADIOLOGY

## 2022-08-22 PROCEDURE — 77336 RADIATION PHYSICS CONSULT: CPT | Performed by: RADIOLOGY

## 2022-08-22 PROCEDURE — 77387 GUIDANCE FOR RADJ TX DLVR: CPT | Performed by: RADIOLOGY

## 2022-08-22 PROCEDURE — 77412 RADIATION TX DELIVERY LVL 3: CPT | Performed by: RADIOLOGY

## 2022-08-23 PROCEDURE — 77387 GUIDANCE FOR RADJ TX DLVR: CPT | Performed by: RADIOLOGY

## 2022-08-23 PROCEDURE — 77412 RADIATION TX DELIVERY LVL 3: CPT | Performed by: RADIOLOGY

## 2022-08-24 PROCEDURE — 77387 GUIDANCE FOR RADJ TX DLVR: CPT | Performed by: RADIOLOGY

## 2022-08-24 PROCEDURE — 77412 RADIATION TX DELIVERY LVL 3: CPT | Performed by: RADIOLOGY

## 2022-08-25 PROCEDURE — 77412 RADIATION TX DELIVERY LVL 3: CPT | Performed by: RADIOLOGY

## 2022-08-26 PROCEDURE — 77412 RADIATION TX DELIVERY LVL 3: CPT | Performed by: RADIOLOGY

## 2022-08-29 PROCEDURE — 77412 RADIATION TX DELIVERY LVL 3: CPT | Performed by: RADIOLOGY

## 2022-08-29 PROCEDURE — 77336 RADIATION PHYSICS CONSULT: CPT | Performed by: RADIOLOGY

## 2022-08-30 PROCEDURE — 77412 RADIATION TX DELIVERY LVL 3: CPT | Performed by: RADIOLOGY

## 2022-08-31 DIAGNOSIS — C50.911 MUCINOUS CARCINOMA OF RIGHT BREAST: Primary | ICD-10-CM

## 2022-08-31 DIAGNOSIS — D05.11 DUCTAL CARCINOMA IN SITU (DCIS) OF RIGHT BREAST: ICD-10-CM

## 2022-09-01 ENCOUNTER — OFFICE VISIT (OUTPATIENT)
Dept: HEMATOLOGY/ONCOLOGY | Facility: CLINIC | Age: 67
End: 2022-09-01
Payer: MEDICAID

## 2022-09-01 VITALS
RESPIRATION RATE: 18 BRPM | SYSTOLIC BLOOD PRESSURE: 105 MMHG | HEART RATE: 69 BPM | BODY MASS INDEX: 35.33 KG/M2 | OXYGEN SATURATION: 99 % | WEIGHT: 199.38 LBS | HEIGHT: 63 IN | TEMPERATURE: 98 F | DIASTOLIC BLOOD PRESSURE: 45 MMHG

## 2022-09-01 DIAGNOSIS — Z17.0 MALIGNANT NEOPLASM OF UPPER-OUTER QUADRANT OF BREAST IN FEMALE, ESTROGEN RECEPTOR POSITIVE, UNSPECIFIED LATERALITY: Primary | ICD-10-CM

## 2022-09-01 DIAGNOSIS — C50.419 MALIGNANT NEOPLASM OF UPPER-OUTER QUADRANT OF BREAST IN FEMALE, ESTROGEN RECEPTOR POSITIVE, UNSPECIFIED LATERALITY: Primary | ICD-10-CM

## 2022-09-01 PROCEDURE — 99215 OFFICE O/P EST HI 40 MIN: CPT | Mod: PBBFAC | Performed by: INTERNAL MEDICINE

## 2022-09-01 PROCEDURE — 4010F ACE/ARB THERAPY RXD/TAKEN: CPT | Mod: CPTII,,, | Performed by: INTERNAL MEDICINE

## 2022-09-01 PROCEDURE — 3078F DIAST BP <80 MM HG: CPT | Mod: CPTII,,, | Performed by: INTERNAL MEDICINE

## 2022-09-01 PROCEDURE — 1159F MED LIST DOCD IN RCRD: CPT | Mod: CPTII,,, | Performed by: INTERNAL MEDICINE

## 2022-09-01 PROCEDURE — 3008F BODY MASS INDEX DOCD: CPT | Mod: CPTII,,, | Performed by: INTERNAL MEDICINE

## 2022-09-01 PROCEDURE — 99999 PR PBB SHADOW E&M-EST. PATIENT-LVL V: CPT | Mod: PBBFAC,,, | Performed by: INTERNAL MEDICINE

## 2022-09-01 PROCEDURE — 3078F PR MOST RECENT DIASTOLIC BLOOD PRESSURE < 80 MM HG: ICD-10-PCS | Mod: CPTII,,, | Performed by: INTERNAL MEDICINE

## 2022-09-01 PROCEDURE — 99214 PR OFFICE/OUTPT VISIT, EST, LEVL IV, 30-39 MIN: ICD-10-PCS | Mod: S$PBB,,, | Performed by: INTERNAL MEDICINE

## 2022-09-01 PROCEDURE — 3074F PR MOST RECENT SYSTOLIC BLOOD PRESSURE < 130 MM HG: ICD-10-PCS | Mod: CPTII,,, | Performed by: INTERNAL MEDICINE

## 2022-09-01 PROCEDURE — 99214 OFFICE O/P EST MOD 30 MIN: CPT | Mod: S$PBB,,, | Performed by: INTERNAL MEDICINE

## 2022-09-01 PROCEDURE — 3008F PR BODY MASS INDEX (BMI) DOCUMENTED: ICD-10-PCS | Mod: CPTII,,, | Performed by: INTERNAL MEDICINE

## 2022-09-01 PROCEDURE — 3074F SYST BP LT 130 MM HG: CPT | Mod: CPTII,,, | Performed by: INTERNAL MEDICINE

## 2022-09-01 PROCEDURE — 99999 PR PBB SHADOW E&M-EST. PATIENT-LVL V: ICD-10-PCS | Mod: PBBFAC,,, | Performed by: INTERNAL MEDICINE

## 2022-09-01 PROCEDURE — 4010F PR ACE/ARB THEARPY RXD/TAKEN: ICD-10-PCS | Mod: CPTII,,, | Performed by: INTERNAL MEDICINE

## 2022-09-01 PROCEDURE — 1159F PR MEDICATION LIST DOCUMENTED IN MEDICAL RECORD: ICD-10-PCS | Mod: CPTII,,, | Performed by: INTERNAL MEDICINE

## 2022-09-01 RX ORDER — FLUTICASONE PROPIONATE 50 MCG
1 SPRAY, SUSPENSION (ML) NASAL DAILY
COMMUNITY
Start: 2022-08-30

## 2022-09-01 NOTE — PROGRESS NOTES
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     CARCINOMA IS 1 MM FROM THE SUPERIOR MARGIN (NEW SUPERIOR MARGIN CLEAR, SEE SPECIMEN #3).     MINIMAL DUCTAL CARCINOMA IN SITU, LOW GRADE.           TUMOR        Histologic Type: Mucinous carcinoma        Histologic Grade ([place] Histologic Score): [place] Score        Glandular (Acinar) / Tubular Differentiation: Score 1 (greater than 75% of tumor area forming glandular / tubular structures)        Nuclear Pleomorphism: Score 1 (Nuclei small with little increase in size in comparison with normal breast epithelial cells, regular outlines, uniform nuclear chromatin, little variation in size)        Mitotic Rate: Score 1        Overall Grade: Grade 1 (scores of 3, 4 or 5)        Tumor Size: Greatest dimension of largest invasive focus greater than 1 mm (specify exact measurement in Millimeters (mm)): 8 mm        Tumor Focality: Single focus of invasive carcinoma        Ductal Carcinoma In Situ (DCIS): Present        Ductal Carcinoma In Situ (DCIS): Negative for extensive intraductal component (EIC)        Nuclear Grade: Grade I (low)        Lymphovascular Invasion: Not identified        Dermal Lymphovascular Invasion: No skin present        Treatment Effect in the Breast: No known presurgical therapy     MARGINS        Margin Status for Invasive Carcinoma: All margins negative for invasive carcinoma        Distance from Invasive Carcinoma to Closest Margin: Greater than: 10 mm        Closest Margin(s) to Invasive Carcinoma: Superior        Margin Status for DCIS: All  margins negative for DCIS        Distance from DCIS to Closest Margin: Greater than: 10 mm        Closest Margin(s) to DCIS: Superior     REGIONAL LYMPH NODES        Regional Lymph Node Status: Regional lymph nodes present             All regional lymph nodes negative for tumor        Total Number of Lymph Nodes Examined (sentinel and non-sentinel): Exact number : 10        Number of [place] Nodes Examined: Exact number : 4      INTERVAL Hx:  09/01/2022  Since last seen, XRT was completed just 2 days ago.  Patient has some pruritus involving the radiation field but no skin breakdown.  She is coming in to discuss recommendations regarding postsurgical and post radiation antihormone treatment.  Results of Oncotype DX scores come back.  Patient has an in-house proprietary score of only 9 which translates to a 3% chance of current disease at the 11 year bella if treated with anti hormone treatment and essentially no benefit from systemic chemotherapy.     Past Medical History:   Past Medical History:   Diagnosis Date    Arthritis     Breast cancer     Disorder of thyroid, unspecified     DM (diabetes mellitus)     Generalized anxiety disorder     HTN (hypertension)     Mixed hyperlipidemia     PONV (postoperative nausea and vomiting)     Pulmonary nodules     Thalassemia minor        Past Surgical HIstory:   Past Surgical History:   Procedure Laterality Date    CHOLECYSTECTOMY      HYSTERECTOMY         Family History:   Family History   Problem Relation Age of Onset    Dementia Mother     Cancer Father     Thalassemia Sister     Diabetes Mellitus Brother     Asthma Brother        Social History:  reports that she has never smoked. She has never used smokeless tobacco. She reports that she does not drink alcohol and does not use drugs.    Allergies:  Review of patient's allergies indicates:   Allergen Reactions    Adhesive tape-silicones        Medications:  Current Outpatient Medications   Medication Sig Dispense Refill     ALPRAZolam (XANAX) 0.5 MG tablet Take 0.5 mg by mouth 2 (two) times daily.      amlodipine-benazepril 10-20mg (LOTREL) 10-20 mg per capsule Take 1 capsule by mouth once daily.      aspirin (ECOTRIN) 81 MG EC tablet Take 81 mg by mouth once daily.      atenoloL (TENORMIN) 50 MG tablet Take 50 mg by mouth nightly.      fluticasone propionate (FLONASE) 50 mcg/actuation nasal spray 1 spray by Each Nostril route once daily.      glimepiride (AMARYL) 4 MG tablet Take 4 mg by mouth 2 (two) times daily.      HYDROcodone-acetaminophen (NORCO) 5-325 mg per tablet Take 1 tablet by mouth every 6 (six) hours as needed for Pain. 18 tablet 0    JANUMET 50-1,000 mg per tablet Take 1 tablet by mouth 2 (two) times daily.      levothyroxine (SYNTHROID) 50 MCG tablet Take 50 mcg by mouth once daily.      pioglitazone (ACTOS) 30 MG tablet Take 30 mg by mouth once daily.      simvastatin (ZOCOR) 40 MG tablet Take 40 mg by mouth once daily.      vitamin D (VITAMIN D3) 1000 units Tab Take 1,000 Units by mouth once daily.       No current facility-administered medications for this visit.     ROS:   GENERAL: Patient denies fevers, chills, weight loss.    HEENT:  No recent change in vision, hearing, taste or smell.  LUNGS:  Patient denies cough, sputum production, hemoptysis, or shortness of breath.  COR:  Patient denies classic angina type chest pain or palpitations.  GI:  Patient denies nausea, vomiting, abdominal pain, black or bloody bowel movements, or change in bowel habits  :  Patient denies dysuria, hematuria, or lower urinary tract obstructive symptomatology.  LYMPHS:  Patient denies palpable peripheral lymphadenopathy   DERM: Patient denies skin lesions  MSK:  Patient denies muscle bone or joint problems.  EXT:  Patient denies clubbing, cyanosis, or edema.  BREAST/CHEST WALL:  Patient denies breast or chest wall anomalies.  NEURO:  Patient denies focal neurological symptoms.  PSYCH:  Patient denies depression or anxiety  issues.    OBSERVATIONS:  GENERAL:  Alert, oriented x3, and in no apparent distress at rest.    HEENT:  PERRLA.  EOMI.  Nasal/oropharynx benign  VS:  Afebrile.  105/45  NECK:  Supple, full range of motion, without bruits or thyromegaly  LUNGS: Clear to auscultation and percussion.  BACK:  Without spinal or CVA tenderness.  COR:  Regular rate rhythm without murmur.  ABD:  Soft, nontender, positive bowel sounds without masses or visceromegaly  :  Deferred.  LYMPHS:  No palpable peripheral lymphadenopathy   DERM:  No cutaneous lesions are noted  EXT:  Without clubbing, cyanosis, or edema  BREAST/CHEST WALL:  Normal breasts/chest wall without palpable masses, skin retraction, or other signs suggesting malignant process.  NEURO:  Cranial nerves 2-12 are grossly intact.  No evidence of motor or sensory dysfunction.  PSYCH:  No features suggesting depression or anxiety.      Laboratory Data:  Lab Visit on 09/01/2022   Component Date Value Ref Range Status    WBC 09/01/2022 8.9  4.5 - 11.5 x10(3)/mcL Final    RBC 09/01/2022 5.85 (H)  4.20 - 5.40 x10(6)/mcL Final    Hgb 09/01/2022 11.0 (L)  12.0 - 16.0 gm/dL Final    Hct 09/01/2022 36.8 (L)  37.0 - 47.0 % Final    MCV 09/01/2022 62.9 (L)  80.0 - 94.0 fL Final    MCH 09/01/2022 18.8 (L)  27.0 - 31.0 pg Final    MCHC 09/01/2022 29.9 (L)  33.0 - 36.0 mg/dL Final    RDW 09/01/2022 17.8 (H)  11.5 - 17.0 % Final    Platelet 09/01/2022 261  130 - 400 x10(3)/mcL Final    MPV 09/01/2022 8.7  7.4 - 10.4 fL Final    Neut % 09/01/2022 73.1  % Final    Lymph % 09/01/2022 17.2  % Final    Mono % 09/01/2022 6.9  % Final    Eos % 09/01/2022 1.8  % Final    Basophil % 09/01/2022 0.4  % Final    Lymph # 09/01/2022 1.53  0.6 - 4.6 x10(3)/mcL Final    Neut # 09/01/2022 6.5  2.1 - 9.2 x10(3)/mcL Final    Mono # 09/01/2022 0.61  0.1 - 1.3 x10(3)/mcL Final    Eos # 09/01/2022 0.16  0 - 0.9 x10(3)/mcL Final    Baso # 09/01/2022 0.04  0 - 0.2 x10(3)/mcL Final    IG# 09/01/2022 0.05 (H)  0 - 0.04  x10(3)/mcL Final    IG% 09/01/2022 0.6  % Final    RBC Morph 09/01/2022 Abnormal (A)  Normal Final    Hypochrom 09/01/2022 1+ (A)  (none) Final    Microcyte 09/01/2022 1+ (A)  (none) Final    Poik 09/01/2022 1+ (A)  (none) Final    Platelet Est 09/01/2022 Adequate  Normal, Adequate Final     IMAGING:   EXAMINATION:  XR CHEST PA AND LATERAL     CLINICAL HISTORY:  pre op breast;, Malignant neoplasm of unspecified site of right female breast.     FINDINGS:  No alveolar consolidation, effusion, or pneumothorax is seen.   The thoracic aorta is ectatic and calcified, but otherwise the  cardiac silhouette, central pulmonary vessels and mediastinum are normal in size and are grossly unremarkable. Except for degenerative changes, the  visualized osseous structures are grossly unremarkable.     Impression:     No acute chest disease is identified.        Electronically signed by: Tereso Menjivar  Date:                                            05/24/2022  Time:                                           08:46    ONCOTYPE Dx:  07/18/2022:  Proprietary recurrent score of 9, distance recurrence at 11 year bella at 3% with antihormone treatment, and benefit from chemotherapy less than 1%.    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  Now time to consider antihormone treatment.  Patient given literature to review concerning aromatase inhibitor (Arimidex) versus antihormone treatment with the older drug, tamoxifen.  Patient informed that aromatase inhibitors are perhaps slightly better in regards to survivorship but definitely more toxic with osteoporosis, joint pain, hot flashes, emotional lability, etc.  Patient is not sure what she wants to do at this point in time and would like to review the literature and she would like to have a month or two off before she would consider going on an  antihormone treatment.     PLAN:  Literature given for patient to review concerning an aromatase inhibitor versus tamoxifen.  Patient will review literature and come back and see us in 2 months to discuss her decision at that time.    AV OWENS M.D., [facility]

## 2023-01-12 DIAGNOSIS — C50.419 MALIGNANT NEOPLASM OF UPPER-OUTER QUADRANT OF BREAST IN FEMALE, ESTROGEN RECEPTOR POSITIVE, UNSPECIFIED LATERALITY: Primary | ICD-10-CM

## 2023-01-12 DIAGNOSIS — Z17.0 MALIGNANT NEOPLASM OF UPPER-OUTER QUADRANT OF BREAST IN FEMALE, ESTROGEN RECEPTOR POSITIVE, UNSPECIFIED LATERALITY: Primary | ICD-10-CM

## 2023-01-19 PROBLEM — Z17.0 MALIGNANT NEOPLASM OF BREAST IN FEMALE, ESTROGEN RECEPTOR POSITIVE: Status: ACTIVE | Noted: 2023-01-19

## 2023-01-19 PROBLEM — C50.919 MALIGNANT NEOPLASM OF BREAST IN FEMALE, ESTROGEN RECEPTOR POSITIVE: Status: ACTIVE | Noted: 2023-01-19

## 2023-01-19 NOTE — PROGRESS NOTES
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  female with past medical history significant for      Oncologic History Stage IA ([id number]), NVASIVE MUCINOUS CARCINOMA, GRADE 1 (0.8 CM), ER+99.5% CT+97.9% HER2-  DCIS, grade1    Oncologic Treatment ultrasound guided lumpectomy on 6/8/2022  treated with adjuvant XRT followed by AI       Pathology RIGHT BREAST LUMPECTOMY:     INVASIVE MUCINOUS CARCINOMA, LOYOLA-PHILLIPS GRADE 1 (0.8 CM).     CARCINOMA IS 1 MM FROM THE SUPERIOR MARGIN (NEW SUPERIOR MARGIN CLEAR, SEE SPECIMEN #3).     MINIMAL DUCTAL CARCINOMA IN SITU, LOW GRADE.           TUMOR        Histologic Type: Mucinous carcinoma        Histologic Grade ([place]aphne Histologic Score): Daphne Score        Glandular (Acinar) / Tubular Differentiation: Score 1 (greater than 75% of tumor area forming glandular / tubular structures)        Nuclear Pleomorphism: Score 1 (Nuclei small with little increase in size in comparison with normal breast epithelial cells, regular outlines, uniform nuclear chromatin, little variation in size)        Mitotic Rate: Score 1        Overall Grade: Grade 1 (scores of 3, 4 or 5)        Tumor Size: Greatest dimension of largest invasive focus greater than 1 mm (specify exact measurement in Millimeters (mm)): 8 mm        Tumor Focality: Single focus of invasive carcinoma        Ductal Carcinoma In Situ (DCIS): Present        Ductal Carcinoma In Situ (DCIS): Negative for extensive intraductal component (EIC)        Nuclear Grade: Grade I (low)        Lymphovascular  Invasion: Not identified        Dermal Lymphovascular Invasion: No skin present        Treatment Effect in the Breast: No known presurgical therapy     MARGINS        Margin Status for Invasive Carcinoma: All margins negative for invasive carcinoma        Distance from Invasive Carcinoma to Closest Margin: Greater than: 10 mm        Closest Margin(s) to Invasive Carcinoma: Superior        Margin Status for DCIS: All margins negative for DCIS        Distance from DCIS to Closest Margin: Greater than: 10 mm        Closest Margin(s) to DCIS: Superior     REGIONAL LYMPH NODES        Regional Lymph Node Status: Regional lymph nodes present             All regional lymph nodes negative for tumor        Total Number of Lymph Nodes Examined (sentinel and non-sentinel): Exact number : 10        Number of [place] Nodes Examined: Exact number : 4       Interval History:     Patient with right breast cancer status post lumpectomy and then radiation therapy in August of 2022.  Patient was seen in September with recommendations to be started on adjuvant therapy but she is still very reluctant with concerns about side effects.  Discussed the options of hormonal therapies and side effects and importance of it.  Patient agreed to start on aromatase inhibitor at the moment..  Patient just had a mammogram done recently and does not want to have any breast examination today mentioning that she does not have any problems    Past Medical History:   Diagnosis Date    Arthritis     Breast cancer     Disorder of thyroid, unspecified     DM (diabetes mellitus)     Generalized anxiety disorder     HTN (hypertension)     Mixed hyperlipidemia     PONV (postoperative nausea and vomiting)     Pulmonary nodules     Thalassemia minor       Past Surgical History:   Procedure Laterality Date    CHOLECYSTECTOMY      HYSTERECTOMY       Social History     Socioeconomic History    Marital status:    Tobacco Use    Smoking status: Never     Smokeless tobacco: Never   Substance and Sexual Activity    Alcohol use: Never    Drug use: Never      Family History   Problem Relation Age of Onset    Dementia Mother     Cancer Father     Thalassemia Sister     Diabetes Mellitus Brother     Asthma Brother       Review of patient's allergies indicates:   Allergen Reactions    Adhesive tape-silicones       Review of Systems   Constitutional: Negative.  Negative for activity change, chills, fatigue and fever.   HENT: Negative.     Eyes: Negative.    Respiratory:  Negative for cough and shortness of breath.    Cardiovascular:  Negative for chest pain and leg swelling.   Gastrointestinal:  Negative for constipation, diarrhea, nausea and vomiting.   Endocrine: Negative.    Genitourinary: Negative.    Musculoskeletal:  Negative for arthralgias and myalgias.   Integumentary:  Negative.   Allergic/Immunologic: Negative.    Neurological:  Negative for light-headedness, numbness and headaches.   Hematological: Negative.    Psychiatric/Behavioral: Negative.         Objective:        Vitals:    01/20/23 0852   BP: (!) 109/47   Pulse: (!) 59   Resp: 20   Temp: 97.6 °F (36.4 °C)        Physical Exam  Constitutional:       General: She is not in acute distress.     Appearance: She is well-developed. She is not ill-appearing.   HENT:      Head: Normocephalic and atraumatic.      Mouth/Throat:      Mouth: Mucous membranes are moist.   Eyes:      Extraocular Movements: Extraocular movements intact.      Conjunctiva/sclera: Conjunctivae normal.   Cardiovascular:      Rate and Rhythm: Normal rate and regular rhythm.      Heart sounds: Normal heart sounds.   Pulmonary:      Effort: Pulmonary effort is normal. No respiratory distress.      Breath sounds: Normal breath sounds. No wheezing.   Abdominal:      General: Bowel sounds are normal. There is no distension.      Palpations: Abdomen is soft.      Tenderness: There is no abdominal tenderness.   Musculoskeletal:         General:  Normal range of motion.      Cervical back: Normal range of motion and neck supple.   Skin:     General: Skin is warm.   Neurological:      General: No focal deficit present.      Mental Status: She is alert and oriented to person, place, and time. Mental status is at baseline.      Cranial Nerves: No cranial nerve deficit.   Psychiatric:         Mood and Affect: Mood normal.         LABS / IMAGING      - 12/12/2022 mammogram:  There is an ovoid shaped mass with mostly smooth but partially obscured margins located in the right upper quadrant corresponding to a seroma associated with the surgical scar.  The margin has a somewhat irregularly thickened margin measuring 1-3 mm in thickness and there are focal areas of thin septations associated with the internal margin of this fluid collection.  It measures approximately 4.6 x 2.8 x 6.7 cm.  This shows a typical appearance of a postsurgical/post radiation seroma.  Recommend a repeat mammogram in 6 months      PATHOLOGY   - 06/19/2022: Mucinous carcinoma; Grade 1; 8 mm; LN 0/10, ER 99%, OR 97%, HER2 1+,     ONCOTYPE Dx:  07/18/2022:  Proprietary recurrent score of 9, distance recurrence at 9 year bella at 3% with antihormone treatment, and benefit from chemotherapy less than 1%.    Assessment:     ECOG Performance status:      Stage IA ([id number]), INVASIVE MUCINOUS CARCINOMA, GRADE 1 (0.8 CM), ER+99.5% OR+97.9% HER2- DCIS, grade1  - ultrasound guided lumpectomy on 6/8/2022  - s/p adjuvant XRT - 08/2022.          Plan:     - adjuvant treatment was recommended to the patient in September 2022, patient was reluctant and never came to the clinic for starting the treatment.  She is still very reluctant but after discussing the pros and cons of the treatment she decided to be started on aromatase inhibitor.  Prescribed Arimidex 1 mg daily  - DEXA scan ordered to rule out for any osteoporosis, started with calcium and vitamin-D  - noted to have microcytic anemia, discussed with  the patient, has a history of thalassemia.  Workup ordered to rule out for any other causes  - repeat mammogram in 6 months as recommended by previous done in December of 2022  - Repeat labs on follow up: CBC, CMP  - MD / LABS VISIT - 4 WEEKS     The patient was seen, interviewed and examined. Pertinent lab and radiology studies were reviewed. Pt instructed to call should develop concerning signs/symptoms or have further questions.       Portions of the record may have been created with voice recognition software. Occasional wrong-word or sound-a-like substitutions may have occurred due to the inherent limitations of voice recognition software. Read the chart carefully and recognize, using context, where substitutions have occurred.    Shasha Mast MD  Hematology / Oncology

## 2023-01-20 ENCOUNTER — OFFICE VISIT (OUTPATIENT)
Dept: HEMATOLOGY/ONCOLOGY | Facility: CLINIC | Age: 68
End: 2023-01-20
Payer: MEDICARE

## 2023-01-20 VITALS
RESPIRATION RATE: 20 BRPM | DIASTOLIC BLOOD PRESSURE: 47 MMHG | WEIGHT: 201.38 LBS | HEART RATE: 59 BPM | OXYGEN SATURATION: 99 % | BODY MASS INDEX: 35.68 KG/M2 | SYSTOLIC BLOOD PRESSURE: 109 MMHG | HEIGHT: 63 IN | TEMPERATURE: 98 F

## 2023-01-20 DIAGNOSIS — Z17.0 MALIGNANT NEOPLASM OF RIGHT BREAST IN FEMALE, ESTROGEN RECEPTOR POSITIVE, UNSPECIFIED SITE OF BREAST: ICD-10-CM

## 2023-01-20 DIAGNOSIS — D50.9 MICROCYTIC ANEMIA: Primary | ICD-10-CM

## 2023-01-20 DIAGNOSIS — C50.911 MALIGNANT NEOPLASM OF RIGHT BREAST IN FEMALE, ESTROGEN RECEPTOR POSITIVE, UNSPECIFIED SITE OF BREAST: ICD-10-CM

## 2023-01-20 DIAGNOSIS — M80.08XA AGE-RELATED OSTEOPOROSIS WITH CURRENT PATHOLOGICAL FRACTURE, VERTEBRA(E), INITIAL ENCOUNTER FOR FRACTURE: ICD-10-CM

## 2023-01-20 LAB
FERRITIN SERPL-MCNC: 506.63 NG/ML (ref 4.63–204)
FOLATE SERPL-MCNC: 9.7 NG/ML (ref 7–31.4)
IRON SATN MFR SERPL: 19 % (ref 20–50)
IRON SERPL-MCNC: 59 UG/DL (ref 50–170)
LDH SERPL-CCNC: 203 U/L (ref 125–220)
TIBC SERPL-MCNC: 259 UG/DL (ref 70–310)
TIBC SERPL-MCNC: 318 UG/DL (ref 250–450)

## 2023-01-20 PROCEDURE — 1126F PR PAIN SEVERITY QUANTIFIED, NO PAIN PRESENT: ICD-10-PCS | Mod: CPTII,S$GLB,, | Performed by: INTERNAL MEDICINE

## 2023-01-20 PROCEDURE — 3074F PR MOST RECENT SYSTOLIC BLOOD PRESSURE < 130 MM HG: ICD-10-PCS | Mod: CPTII,S$GLB,, | Performed by: INTERNAL MEDICINE

## 2023-01-20 PROCEDURE — 1126F AMNT PAIN NOTED NONE PRSNT: CPT | Mod: CPTII,S$GLB,, | Performed by: INTERNAL MEDICINE

## 2023-01-20 PROCEDURE — 3078F PR MOST RECENT DIASTOLIC BLOOD PRESSURE < 80 MM HG: ICD-10-PCS | Mod: CPTII,S$GLB,, | Performed by: INTERNAL MEDICINE

## 2023-01-20 PROCEDURE — 3074F SYST BP LT 130 MM HG: CPT | Mod: CPTII,S$GLB,, | Performed by: INTERNAL MEDICINE

## 2023-01-20 PROCEDURE — 3288F FALL RISK ASSESSMENT DOCD: CPT | Mod: CPTII,S$GLB,, | Performed by: INTERNAL MEDICINE

## 2023-01-20 PROCEDURE — 99214 OFFICE O/P EST MOD 30 MIN: CPT | Mod: S$GLB,,, | Performed by: INTERNAL MEDICINE

## 2023-01-20 PROCEDURE — 1159F MED LIST DOCD IN RCRD: CPT | Mod: CPTII,S$GLB,, | Performed by: INTERNAL MEDICINE

## 2023-01-20 PROCEDURE — 1101F PR PT FALLS ASSESS DOC 0-1 FALLS W/OUT INJ PAST YR: ICD-10-PCS | Mod: CPTII,S$GLB,, | Performed by: INTERNAL MEDICINE

## 2023-01-20 PROCEDURE — 82746 ASSAY OF FOLIC ACID SERUM: CPT | Performed by: INTERNAL MEDICINE

## 2023-01-20 PROCEDURE — 3008F BODY MASS INDEX DOCD: CPT | Mod: CPTII,S$GLB,, | Performed by: INTERNAL MEDICINE

## 2023-01-20 PROCEDURE — 3008F PR BODY MASS INDEX (BMI) DOCUMENTED: ICD-10-PCS | Mod: CPTII,S$GLB,, | Performed by: INTERNAL MEDICINE

## 2023-01-20 PROCEDURE — 99999 PR PBB SHADOW E&M-EST. PATIENT-LVL V: CPT | Mod: PBBFAC,,, | Performed by: INTERNAL MEDICINE

## 2023-01-20 PROCEDURE — 83550 IRON BINDING TEST: CPT | Performed by: INTERNAL MEDICINE

## 2023-01-20 PROCEDURE — 82728 ASSAY OF FERRITIN: CPT | Performed by: INTERNAL MEDICINE

## 2023-01-20 PROCEDURE — 83615 LACTATE (LD) (LDH) ENZYME: CPT | Performed by: INTERNAL MEDICINE

## 2023-01-20 PROCEDURE — 3078F DIAST BP <80 MM HG: CPT | Mod: CPTII,S$GLB,, | Performed by: INTERNAL MEDICINE

## 2023-01-20 PROCEDURE — 99999 PR PBB SHADOW E&M-EST. PATIENT-LVL V: ICD-10-PCS | Mod: PBBFAC,,, | Performed by: INTERNAL MEDICINE

## 2023-01-20 PROCEDURE — 1101F PT FALLS ASSESS-DOCD LE1/YR: CPT | Mod: CPTII,S$GLB,, | Performed by: INTERNAL MEDICINE

## 2023-01-20 PROCEDURE — 1159F PR MEDICATION LIST DOCUMENTED IN MEDICAL RECORD: ICD-10-PCS | Mod: CPTII,S$GLB,, | Performed by: INTERNAL MEDICINE

## 2023-01-20 PROCEDURE — 3288F PR FALLS RISK ASSESSMENT DOCUMENTED: ICD-10-PCS | Mod: CPTII,S$GLB,, | Performed by: INTERNAL MEDICINE

## 2023-01-20 PROCEDURE — 99214 PR OFFICE/OUTPT VISIT, EST, LEVL IV, 30-39 MIN: ICD-10-PCS | Mod: S$GLB,,, | Performed by: INTERNAL MEDICINE

## 2023-01-20 RX ORDER — ANASTROZOLE 1 MG/1
1 TABLET ORAL DAILY
Qty: 30 TABLET | Refills: 5 | Status: SHIPPED | OUTPATIENT
Start: 2023-01-20 | End: 2023-05-19 | Stop reason: SDUPTHER

## 2023-01-20 RX ORDER — DIPHENOXYLATE HYDROCHLORIDE AND ATROPINE SULFATE 2.5; .025 MG/1; MG/1
1 TABLET ORAL
COMMUNITY
Start: 2022-10-10

## 2023-01-20 RX ORDER — FUROSEMIDE 20 MG/1
20 TABLET ORAL DAILY PRN
COMMUNITY
Start: 2022-11-04

## 2023-01-20 RX ORDER — FERROUS SULFATE, DRIED 160(50) MG
1 TABLET, EXTENDED RELEASE ORAL 2 TIMES DAILY
Qty: 180 TABLET | Refills: 3 | Status: SHIPPED | OUTPATIENT
Start: 2023-01-20 | End: 2024-01-20

## 2023-01-25 ENCOUNTER — OFFICE VISIT (OUTPATIENT)
Dept: SURGICAL ONCOLOGY | Facility: CLINIC | Age: 68
End: 2023-01-25
Payer: MEDICARE

## 2023-01-25 VITALS
HEART RATE: 56 BPM | SYSTOLIC BLOOD PRESSURE: 135 MMHG | DIASTOLIC BLOOD PRESSURE: 76 MMHG | WEIGHT: 204.38 LBS | BODY MASS INDEX: 36.22 KG/M2

## 2023-01-25 DIAGNOSIS — C50.911 MALIGNANT NEOPLASM OF RIGHT FEMALE BREAST, UNSPECIFIED ESTROGEN RECEPTOR STATUS, UNSPECIFIED SITE OF BREAST: Primary | ICD-10-CM

## 2023-01-25 PROCEDURE — 3008F BODY MASS INDEX DOCD: CPT | Mod: CPTII,S$GLB,, | Performed by: SURGERY

## 2023-01-25 PROCEDURE — 3008F PR BODY MASS INDEX (BMI) DOCUMENTED: ICD-10-PCS | Mod: CPTII,S$GLB,, | Performed by: SURGERY

## 2023-01-25 PROCEDURE — 99214 PR OFFICE/OUTPT VISIT, EST, LEVL IV, 30-39 MIN: ICD-10-PCS | Mod: S$GLB,,, | Performed by: SURGERY

## 2023-01-25 PROCEDURE — 99999 PR PBB SHADOW E&M-EST. PATIENT-LVL II: CPT | Mod: PBBFAC,,, | Performed by: SURGERY

## 2023-01-25 PROCEDURE — 3075F SYST BP GE 130 - 139MM HG: CPT | Mod: CPTII,S$GLB,, | Performed by: SURGERY

## 2023-01-25 PROCEDURE — 3078F DIAST BP <80 MM HG: CPT | Mod: CPTII,S$GLB,, | Performed by: SURGERY

## 2023-01-25 PROCEDURE — 99999 PR PBB SHADOW E&M-EST. PATIENT-LVL II: ICD-10-PCS | Mod: PBBFAC,,, | Performed by: SURGERY

## 2023-01-25 PROCEDURE — 3075F PR MOST RECENT SYSTOLIC BLOOD PRESS GE 130-139MM HG: ICD-10-PCS | Mod: CPTII,S$GLB,, | Performed by: SURGERY

## 2023-01-25 PROCEDURE — 99214 OFFICE O/P EST MOD 30 MIN: CPT | Mod: S$GLB,,, | Performed by: SURGERY

## 2023-01-25 PROCEDURE — 3078F PR MOST RECENT DIASTOLIC BLOOD PRESSURE < 80 MM HG: ICD-10-PCS | Mod: CPTII,S$GLB,, | Performed by: SURGERY

## 2023-01-25 NOTE — PROGRESS NOTES
Chief complaint:  Follow-up breast cancer     HPI:  Patient returns for follow-up of Stage IA ([id number]), INVASIVE MUCINOUS CARCINOMA, GRADE 1 (0.8 CM), ER+ AK+ ; HER2 1+, DCIS, grade1, status post lumpectomy June 8, 2022 followed by adjuvant radiation therapy.  She is set to begin antiestrogen therapy.She denies breast masses, skin changes, nipple inversion or discharge on self-breast exam.    Recent surveillance mammography was reviewed, I personally interpreted the images and reviewed the report.  I discussed with the patient in detail and questions were addressed.  There are no suspicious findings.    Greater than 30 minutes was required for complete chart review, imaging review patient consultation and documentation        Past Medical and Surgical History  Allergies :   Adhesive tape-silicones    [facility]  Medical :   She has a past medical history of Arthritis, Breast cancer, Disorder of thyroid, unspecified, DM (diabetes mellitus), Generalized anxiety disorder, HTN (hypertension), Mixed hyperlipidemia, PONV (postoperative nausea and vomiting), Pulmonary nodules, and Thalassemia minor.    Surgical :   She has a past surgical history that includes Cholecystectomy and Hysterectomy.     Family History  Her family history includes Asthma in her brother; Cancer in her father; Dementia in her mother; Diabetes Mellitus in her brother; Thalassemia in her sister.    Social History  She reports that she has never smoked. She has never used smokeless tobacco. She reports that she does not drink alcohol and does not use drugs.     Review of Systems   Constitutional:  Negative for appetite change, chills, diaphoresis and fever.   HENT:  Negative for congestion, drooling, ear discharge, ear pain and hearing loss.    Eyes:  Negative for discharge.   Respiratory:  Negative for apnea, cough, choking, chest tightness, shortness of breath and stridor.    Cardiovascular:  Negative for chest pain, palpitations and leg swelling.    Endocrine: Negative for cold intolerance and heat intolerance.   Genitourinary:  Negative for difficulty urinating, dyspareunia, dysuria and hematuria.   Musculoskeletal:  Negative for arthralgias, gait problem and joint swelling.   Skin:  Negative for color change and rash.   Neurological:  Negative for dizziness, tremors, seizures, syncope, facial asymmetry, speech difficulty, light-headedness, numbness and headaches.   Psychiatric/Behavioral:  Negative for agitation and confusion.       Objective   Physical Exam  Vitals and nursing note reviewed.   Constitutional:       General: She is not in acute distress.     Appearance: Normal appearance. She is normal weight. She is not ill-appearing, toxic-appearing or diaphoretic.   HENT:      Head: Normocephalic and atraumatic.      Right Ear: External ear normal.      Left Ear: External ear normal.      Nose: Nose normal.      Mouth/Throat:      Mouth: Mucous membranes are moist.      Pharynx: Oropharynx is clear.   Eyes:      General: No scleral icterus.     Extraocular Movements: Extraocular movements intact.      Conjunctiva/sclera: Conjunctivae normal.      Pupils: Pupils are equal, round, and reactive to light.   Cardiovascular:      Rate and Rhythm: Normal rate and regular rhythm.      Pulses: Normal pulses.      Heart sounds: Normal heart sounds. No murmur heard.    No friction rub. No gallop.   Pulmonary:      Effort: Pulmonary effort is normal. No respiratory distress.      Breath sounds: Normal breath sounds. No stridor. No wheezing or rhonchi.   Chest:      Chest wall: No tenderness.   Breasts:     Right: No swelling, bleeding, inverted nipple, mass, nipple discharge, skin change or tenderness.      Left: No swelling, bleeding, inverted nipple, mass, nipple discharge, skin change or tenderness.   Abdominal:      General: Abdomen is flat. There is no distension.      Palpations: Abdomen is soft. There is no mass.      Tenderness: There is no abdominal  tenderness. There is no right CVA tenderness, left CVA tenderness, guarding or rebound.      Hernia: No hernia is present.   Musculoskeletal:         General: No swelling, tenderness, deformity or signs of injury. Normal range of motion.      Cervical back: Normal range of motion and neck supple. No rigidity or tenderness.      Right lower leg: No edema.      Left lower leg: No edema.   Lymphadenopathy:      Cervical: No cervical adenopathy.      Upper Body:      Right upper body: No supraclavicular or axillary adenopathy.      Left upper body: No supraclavicular or axillary adenopathy.   Skin:     General: Skin is warm.      Capillary Refill: Capillary refill takes less than 2 seconds.      Coloration: Skin is not jaundiced or pale.      Findings: No bruising, erythema, lesion or rash.   Neurological:      General: No focal deficit present.      Mental Status: She is alert and oriented to person, place, and time. Mental status is at baseline.      Cranial Nerves: No cranial nerve deficit.      Sensory: No sensory deficit.      Motor: No weakness.      Coordination: Coordination normal.      Gait: Gait normal.   Psychiatric:         Mood and Affect: Mood normal.         Behavior: Behavior normal.         Thought Content: Thought content normal.         Judgment: Judgment normal.     VITAL SIGNS: 24 HR MIN & MAX LAST    @FLOWSTAT(6:24::1)@           @FLOWSTAT(5:24::1)@  135/76     @FLOWSTAT(8:24::1)@  (!) 56     @FLOWSTAT(9:24::1)@       @FLOWSTAT(10:24::1)@         HT:    WT: 92.7 kg (204 lb 6.4 oz)  BMI:         Assessment & Plan     Stage I right breast cancer status post breast conserving therapy, lumpectomy June 2022    No evidence of disease    Return to clinic in 6 months with bilateral diagnostic mammography

## 2023-01-26 DIAGNOSIS — Z85.3 PERSONAL HISTORY OF BREAST CANCER: Primary | ICD-10-CM

## 2023-01-27 ENCOUNTER — HOSPITAL ENCOUNTER (OUTPATIENT)
Dept: RADIOLOGY | Facility: HOSPITAL | Age: 68
Discharge: HOME | End: 2023-01-27
Attending: INTERNAL MEDICINE
Payer: MEDICARE

## 2023-01-27 DIAGNOSIS — M80.08XA AGE-RELATED OSTEOPOROSIS WITH CURRENT PATHOLOGICAL FRACTURE, VERTEBRA(E), INITIAL ENCOUNTER FOR FRACTURE: ICD-10-CM

## 2023-01-27 DIAGNOSIS — Z17.0 MALIGNANT NEOPLASM OF RIGHT BREAST IN FEMALE, ESTROGEN RECEPTOR POSITIVE, UNSPECIFIED SITE OF BREAST: ICD-10-CM

## 2023-01-27 DIAGNOSIS — C50.911 MALIGNANT NEOPLASM OF RIGHT BREAST IN FEMALE, ESTROGEN RECEPTOR POSITIVE, UNSPECIFIED SITE OF BREAST: ICD-10-CM

## 2023-01-27 PROCEDURE — 77080 DXA BONE DENSITY AXIAL: CPT | Mod: TC

## 2023-02-23 DIAGNOSIS — C50.911 MALIGNANT NEOPLASM OF RIGHT BREAST IN FEMALE, ESTROGEN RECEPTOR POSITIVE, UNSPECIFIED SITE OF BREAST: Primary | ICD-10-CM

## 2023-02-23 DIAGNOSIS — Z17.0 MALIGNANT NEOPLASM OF RIGHT BREAST IN FEMALE, ESTROGEN RECEPTOR POSITIVE, UNSPECIFIED SITE OF BREAST: Primary | ICD-10-CM

## 2023-02-24 ENCOUNTER — LAB VISIT (OUTPATIENT)
Dept: LAB | Facility: HOSPITAL | Age: 68
End: 2023-02-24
Payer: MEDICARE

## 2023-02-24 ENCOUNTER — CLINICAL SUPPORT (OUTPATIENT)
Dept: HEMATOLOGY/ONCOLOGY | Facility: CLINIC | Age: 68
End: 2023-02-24
Payer: MEDICARE

## 2023-02-24 VITALS
HEART RATE: 58 BPM | WEIGHT: 202.19 LBS | BODY MASS INDEX: 37.21 KG/M2 | SYSTOLIC BLOOD PRESSURE: 148 MMHG | DIASTOLIC BLOOD PRESSURE: 78 MMHG | HEIGHT: 62 IN | OXYGEN SATURATION: 100 % | RESPIRATION RATE: 18 BRPM | TEMPERATURE: 98 F

## 2023-02-24 DIAGNOSIS — C50.911 MUCINOUS CARCINOMA OF RIGHT BREAST: ICD-10-CM

## 2023-02-24 DIAGNOSIS — R23.2 HOT FLASHES: ICD-10-CM

## 2023-02-24 DIAGNOSIS — D05.11 DUCTAL CARCINOMA IN SITU (DCIS) OF RIGHT BREAST: ICD-10-CM

## 2023-02-24 DIAGNOSIS — D56.9 THALASSEMIA, UNSPECIFIED TYPE: ICD-10-CM

## 2023-02-24 DIAGNOSIS — C50.911 MALIGNANT NEOPLASM OF RIGHT BREAST IN FEMALE, ESTROGEN RECEPTOR POSITIVE, UNSPECIFIED SITE OF BREAST: ICD-10-CM

## 2023-02-24 DIAGNOSIS — D50.9 MICROCYTIC ANEMIA: ICD-10-CM

## 2023-02-24 DIAGNOSIS — C50.419 MALIGNANT NEOPLASM OF UPPER-OUTER QUADRANT OF BREAST IN FEMALE, ESTROGEN RECEPTOR POSITIVE, UNSPECIFIED LATERALITY: ICD-10-CM

## 2023-02-24 DIAGNOSIS — C50.911 MALIGNANT NEOPLASM OF RIGHT BREAST IN FEMALE, ESTROGEN RECEPTOR POSITIVE, UNSPECIFIED SITE OF BREAST: Primary | ICD-10-CM

## 2023-02-24 DIAGNOSIS — Z17.0 MALIGNANT NEOPLASM OF RIGHT BREAST IN FEMALE, ESTROGEN RECEPTOR POSITIVE, UNSPECIFIED SITE OF BREAST: ICD-10-CM

## 2023-02-24 DIAGNOSIS — R53.83 FATIGUE, UNSPECIFIED TYPE: ICD-10-CM

## 2023-02-24 DIAGNOSIS — Z17.0 ER+ (ESTROGEN RECEPTOR POSITIVE STATUS): ICD-10-CM

## 2023-02-24 DIAGNOSIS — Z17.0 MALIGNANT NEOPLASM OF UPPER-OUTER QUADRANT OF BREAST IN FEMALE, ESTROGEN RECEPTOR POSITIVE, UNSPECIFIED LATERALITY: ICD-10-CM

## 2023-02-24 DIAGNOSIS — Z17.0 MALIGNANT NEOPLASM OF RIGHT BREAST IN FEMALE, ESTROGEN RECEPTOR POSITIVE, UNSPECIFIED SITE OF BREAST: Primary | ICD-10-CM

## 2023-02-24 LAB
ALBUMIN SERPL-MCNC: 3.7 G/DL (ref 3.4–4.8)
ALBUMIN/GLOB SERPL: 1 RATIO (ref 1.1–2)
ALP SERPL-CCNC: 59 UNIT/L (ref 40–150)
ALT SERPL-CCNC: 14 UNIT/L (ref 0–55)
AST SERPL-CCNC: 17 UNIT/L (ref 5–34)
BASOPHILS # BLD AUTO: 0.04 X10(3)/MCL (ref 0–0.2)
BASOPHILS NFR BLD AUTO: 0.4 %
BILIRUBIN DIRECT+TOT PNL SERPL-MCNC: 0.5 MG/DL
BUN SERPL-MCNC: 16 MG/DL (ref 9.8–20.1)
CALCIUM SERPL-MCNC: 10.5 MG/DL (ref 8.4–10.2)
CHLORIDE SERPL-SCNC: 106 MMOL/L (ref 98–107)
CO2 SERPL-SCNC: 24 MMOL/L (ref 23–31)
CREAT SERPL-MCNC: 0.77 MG/DL (ref 0.55–1.02)
EOSINOPHIL # BLD AUTO: 0.13 X10(3)/MCL (ref 0–0.9)
EOSINOPHIL NFR BLD AUTO: 1.3 %
ERYTHROCYTE [DISTWIDTH] IN BLOOD BY AUTOMATED COUNT: 18.7 % (ref 11.5–17)
GFR SERPLBLD CREATININE-BSD FMLA CKD-EPI: >60 MLS/MIN/1.73/M2
GLOBULIN SER-MCNC: 3.7 GM/DL (ref 2.4–3.5)
GLUCOSE SERPL-MCNC: 199 MG/DL (ref 82–115)
HAPTOGLOB SERPL-MCNC: 219 MG/DL (ref 63–273)
HCT VFR BLD AUTO: 36.4 % (ref 37–47)
HGB BLD-MCNC: 10.7 G/DL (ref 12–16)
HYPOCHROMIA BLD QL SMEAR: SLIGHT
IMM GRANULOCYTES # BLD AUTO: 0.02 X10(3)/MCL (ref 0–0.04)
IMM GRANULOCYTES NFR BLD AUTO: 0.2 %
LYMPHOCYTES # BLD AUTO: 2.56 X10(3)/MCL (ref 0.6–4.6)
LYMPHOCYTES NFR BLD AUTO: 25.7 %
MCH RBC QN AUTO: 18.6 PG
MCHC RBC AUTO-ENTMCNC: 29.4 G/DL (ref 33–36)
MCV RBC AUTO: 63.4 FL (ref 80–94)
MICROCYTES BLD QL SMEAR: SLIGHT
MONOCYTES # BLD AUTO: 0.77 X10(3)/MCL (ref 0.1–1.3)
MONOCYTES NFR BLD AUTO: 7.7 %
NEUTROPHILS # BLD AUTO: 6.46 X10(3)/MCL (ref 2.1–9.2)
NEUTROPHILS NFR BLD AUTO: 64.7 %
PLATELET # BLD AUTO: 305 X10(3)/MCL (ref 130–400)
PLATELET # BLD EST: ADEQUATE 10*3/UL
PMV BLD AUTO: 9.4 FL (ref 7.4–10.4)
POTASSIUM SERPL-SCNC: 4.2 MMOL/L (ref 3.5–5.1)
PROT SERPL-MCNC: 7.4 GM/DL (ref 5.8–7.6)
RBC # BLD AUTO: 5.74 X10(6)/MCL (ref 4.2–5.4)
SODIUM SERPL-SCNC: 140 MMOL/L (ref 136–145)
VIT B12 SERPL-MCNC: 325 PG/ML (ref 213–816)
WBC # SPEC AUTO: 10 X10(3)/MCL (ref 4.5–11.5)

## 2023-02-24 PROCEDURE — 80053 COMPREHEN METABOLIC PANEL: CPT

## 2023-02-24 PROCEDURE — 85025 COMPLETE CBC W/AUTO DIFF WBC: CPT

## 2023-02-24 PROCEDURE — 83010 ASSAY OF HAPTOGLOBIN QUANT: CPT

## 2023-02-24 PROCEDURE — 85060 BLOOD SMEAR INTERPRETATION: CPT

## 2023-02-24 PROCEDURE — 99999 PR PBB SHADOW E&M-EST. PATIENT-LVL IV: CPT | Mod: PBBFAC,,,

## 2023-02-24 PROCEDURE — 99214 OFFICE O/P EST MOD 30 MIN: CPT | Mod: S$GLB,,,

## 2023-02-24 PROCEDURE — 99214 PR OFFICE/OUTPT VISIT, EST, LEVL IV, 30-39 MIN: ICD-10-PCS | Mod: S$GLB,,,

## 2023-02-24 PROCEDURE — 99999 PR PBB SHADOW E&M-EST. PATIENT-LVL IV: ICD-10-PCS | Mod: PBBFAC,,,

## 2023-02-24 PROCEDURE — 82607 VITAMIN B-12: CPT

## 2023-02-24 PROCEDURE — 36415 COLL VENOUS BLD VENIPUNCTURE: CPT

## 2023-02-24 NOTE — PROGRESS NOTES
HEMATOLOGY / ONCOLOGY   CLINIC NOTE     ONCOLOGICAL HISTORY:     Diagnosis:  - Stage IA ([id number]), INVASIVE MUCINOUS CARCINOMA, GRADE 1 (0.8 CM), ER+99.5% AR+97.9% ; HER2 1+, DCIS, grade1    Treatment History:  - ultrasound guided lumpectomy on 6/8/2022  - adjuvant XRT followed by AI    Current Treatment:   Anastrozole 1mg daily  Started 1/28/2023    Subjective:       Chief Complaint: Breast Cancer (Pt reports ongoing fatigue.)          SERENA Stephen  67 y.o.  female with past medical history significant for      Oncologic History Stage IA ([id number]), NVASIVE MUCINOUS CARCINOMA, GRADE 1 (0.8 CM), ER+99.5% AR+97.9% HER2-  DCIS, grade1    Oncologic Treatment ultrasound guided lumpectomy on 6/8/2022  treated with adjuvant XRT followed by AI       Pathology RIGHT BREAST LUMPECTOMY:     INVASIVE MUCINOUS CARCINOMA, LOYOLA-PHILLIPS GRADE 1 (0.8 CM).     CARCINOMA IS 1 MM FROM THE SUPERIOR MARGIN (NEW SUPERIOR MARGIN CLEAR, SEE SPECIMEN #3).     MINIMAL DUCTAL CARCINOMA IN SITU, LOW GRADE.           TUMOR        Histologic Type: Mucinous carcinoma        Histologic Grade ([place] Histologic Score): Daphne Score        Glandular (Acinar) / Tubular Differentiation: Score 1 (greater than 75% of tumor area forming glandular / tubular structures)        Nuclear Pleomorphism: Score 1 (Nuclei small with little increase in size in comparison with normal breast epithelial cells, regular outlines, uniform nuclear chromatin, little variation in size)        Mitotic Rate: Score 1        Overall Grade: Grade 1 (scores of 3, 4 or 5)        Tumor Size: Greatest dimension of largest invasive focus greater than 1 mm (specify exact measurement in Millimeters (mm)): 8 mm        Tumor Focality: Single focus of invasive carcinoma        Ductal Carcinoma In Situ (DCIS): Present        Ductal Carcinoma In Situ (DCIS): Negative for extensive intraductal component (EIC)        Nuclear Grade: Grade I (low)         Lymphovascular Invasion: Not identified        Dermal Lymphovascular Invasion: No skin present        Treatment Effect in the Breast: No known presurgical therapy     MARGINS        Margin Status for Invasive Carcinoma: All margins negative for invasive carcinoma        Distance from Invasive Carcinoma to Closest Margin: Greater than: 10 mm        Closest Margin(s) to Invasive Carcinoma: Superior        Margin Status for DCIS: All margins negative for DCIS        Distance from DCIS to Closest Margin: Greater than: 10 mm        Closest Margin(s) to DCIS: Superior     REGIONAL LYMPH NODES        Regional Lymph Node Status: Regional lymph nodes present             All regional lymph nodes negative for tumor        Total Number of Lymph Nodes Examined (sentinel and non-sentinel): Exact number : 10        Number of [place] Nodes Examined: Exact number : 4       Interval History:   She returns to the clinic today for a one month toxicity check for her anastrozole. She began taking the medication 1/28/2023. She has taken it daily since, without any complications. She does have chronic fatigue that is stable and unchanged which is managed by her PCP Dr. Chun. She also has mild hot flashes. She completed her DEXA 1/27/2023 which showed normal bone density. She denies any vaginal dryness, mood swings, joint pain, headache, or swelling. She is currently scheduled for a FU mammo 5/15/2023, ordered by Dr. Osman.     Past Medical History:   Diagnosis Date    Arthritis     Breast cancer     Disorder of thyroid, unspecified     DM (diabetes mellitus)     Generalized anxiety disorder     HTN (hypertension)     Mixed hyperlipidemia     PONV (postoperative nausea and vomiting)     Pulmonary nodules     Thalassemia minor       Past Surgical History:   Procedure Laterality Date    CHOLECYSTECTOMY      HYSTERECTOMY       Social History     Socioeconomic History    Marital status:    Tobacco Use    Smoking status: Never     Smokeless tobacco: Never   Substance and Sexual Activity    Alcohol use: Never    Drug use: Never      Family History   Problem Relation Age of Onset    Dementia Mother     Cancer Father     Thalassemia Sister     Diabetes Mellitus Brother     Asthma Brother       Review of patient's allergies indicates:   Allergen Reactions    Adhesive tape-silicones       Review of Systems   Constitutional:  Positive for fatigue (chronic). Negative for activity change, chills and fever.   HENT: Negative.     Eyes: Negative.    Respiratory:  Negative for cough and shortness of breath.    Cardiovascular:  Negative for chest pain and leg swelling.   Gastrointestinal:  Negative for constipation, diarrhea, nausea and vomiting.   Endocrine: Negative.    Genitourinary: Negative.    Musculoskeletal:  Negative for arthralgias and myalgias.   Integumentary:  Negative.   Allergic/Immunologic: Negative.    Neurological:  Negative for light-headedness, numbness and headaches.   Hematological: Negative.    Psychiatric/Behavioral: Negative.         Objective:        Vitals:    02/24/23 0836   BP: (!) 148/78   Pulse: (!) 58   Resp: 18   Temp: 97.5 °F (36.4 °C)        Physical Exam  Constitutional:       General: She is not in acute distress.     Appearance: She is well-developed. She is not ill-appearing.   HENT:      Head: Normocephalic and atraumatic.      Mouth/Throat:      Mouth: Mucous membranes are moist.   Eyes:      Extraocular Movements: Extraocular movements intact.      Conjunctiva/sclera: Conjunctivae normal.   Cardiovascular:      Rate and Rhythm: Normal rate and regular rhythm.      Heart sounds: Normal heart sounds.   Pulmonary:      Effort: Pulmonary effort is normal. No respiratory distress.      Breath sounds: Normal breath sounds. No wheezing.   Chest:      Comments: Well healed right lumpectomy. She does have notable scar tissue to lumpectomy site, with mild tenderness to right axillary incision, that is unchanged. No abnormal  masses, rash, discharge, or nipple inversion bilaterally.   Abdominal:      General: Bowel sounds are normal. There is no distension.      Palpations: Abdomen is soft.      Tenderness: There is no abdominal tenderness.   Musculoskeletal:         General: Normal range of motion.      Cervical back: Normal range of motion and neck supple.   Skin:     General: Skin is warm.   Neurological:      General: No focal deficit present.      Mental Status: She is alert and oriented to person, place, and time. Mental status is at baseline.      Cranial Nerves: No cranial nerve deficit.   Psychiatric:         Mood and Affect: Mood normal.         LABS / IMAGING      - 12/12/2022 mammogram:  There is an ovoid shaped mass with mostly smooth but partially obscured margins located in the right upper quadrant corresponding to a seroma associated with the surgical scar.  The margin has a somewhat irregularly thickened margin measuring 1-3 mm in thickness and there are focal areas of thin septations associated with the internal margin of this fluid collection.  It measures approximately 4.6 x 2.8 x 6.7 cm.  This shows a typical appearance of a postsurgical/post radiation seroma.  Recommend a repeat mammogram in 6 months      PATHOLOGY   - 06/19/2022: Mucinous carcinoma; Grade 1; 8 mm; LN 0/10, ER 99%, MD 97%, HER2 1+,     ONCOTYPE Dx:  07/18/2022:  Proprietary recurrent score of 9, distance recurrence at 9 year bella at 3% with antihormone treatment, and benefit from chemotherapy less than 1%.    Assessment:     ECOG Performance status: 0      Stage IA ([id number]), INVASIVE MUCINOUS CARCINOMA, GRADE 1 (0.8 CM), ER+99.5% MD+97.9% HER2- DCIS, grade1  ultrasound guided lumpectomy on 6/8/2022  s/p adjuvant XRT - 08/2022.    Aromasin 1mg daily started 1/28/2023  Tolerating well, without any significant side effects. She does report mild, occasional hot flashes. Continue therapy at this time.  Mammogram   Currently scheduled for bilateral  "mammo 5/15/2023, ordered by Dr. Osman.   Bone density  DEXA completed 1/27/2023 showed normal bone density to both spine and hip.   FU DEXA due 1/28/2025.  She is currently taking OTC calcium + vitamin D supplement. Calcium is slightly elevated today. I did recommend that she decrease calcium to every other day.  Anemia   Hx of thalassemia, managed through Dr. Chun. She does report that she "takes oral iron when needed". Iron studies are stable. Haptoglobin, B12, and Peripheral smear are currently pending. Will continue to monitor labs at this time.         Plan:   Continue Anastrozole daily.  Mammo as scheduled 5/15/2023.  RTC in 3 months with NP for FU/lab    The patient was seen, interviewed and examined. Pertinent lab and radiology studies were reviewed. Pt instructed to call should develop concerning signs/symptoms or have further questions.       Portions of the record may have been created with voice recognition software. Occasional wrong-word or sound-a-like substitutions may have occurred due to the inherent limitations of voice recognition software. Read the chart carefully and recognize, using context, where substitutions have occurred.    Kitty Lyman, FNP-C  Oncology/Hematology   [facility]       "

## 2023-02-25 LAB — HEMATOLOGIST REVIEW: NORMAL

## 2023-02-26 ENCOUNTER — HOSPITAL ENCOUNTER (EMERGENCY)
Facility: HOSPITAL | Age: 68
Discharge: HOME | End: 2023-02-26
Attending: INTERNAL MEDICINE
Payer: MEDICARE

## 2023-02-26 VITALS
TEMPERATURE: 101 F | HEIGHT: 63 IN | SYSTOLIC BLOOD PRESSURE: 147 MMHG | RESPIRATION RATE: 20 BRPM | WEIGHT: 200 LBS | HEART RATE: 117 BPM | OXYGEN SATURATION: 97 % | DIASTOLIC BLOOD PRESSURE: 70 MMHG | BODY MASS INDEX: 35.44 KG/M2

## 2023-02-26 DIAGNOSIS — U07.1 COVID-19: Primary | ICD-10-CM

## 2023-02-26 LAB
APPEARANCE UR: ABNORMAL
BACTERIA #/AREA URNS AUTO: ABNORMAL /HPF
BILIRUB UR QL STRIP.AUTO: ABNORMAL MG/DL
COLOR UR AUTO: ABNORMAL
FLUAV AG UPPER RESP QL IA.RAPID: NOT DETECTED
FLUBV AG UPPER RESP QL IA.RAPID: NOT DETECTED
GLUCOSE UR QL STRIP.AUTO: 100 MG/DL
KETONES UR QL STRIP.AUTO: ABNORMAL MG/DL
LEUKOCYTE ESTERASE UR QL STRIP.AUTO: NEGATIVE UNIT/L
MUCOUS THREADS URNS QL MICRO: ABNORMAL /LPF
NITRITE UR QL STRIP.AUTO: NEGATIVE
PH UR STRIP.AUTO: 5 [PH]
PROT UR QL STRIP.AUTO: 100 MG/DL
RBC #/AREA URNS AUTO: ABNORMAL /HPF
RBC UR QL AUTO: ABNORMAL UNIT/L
SARS-COV-2 RNA RESP QL NAA+PROBE: DETECTED
SP GR UR STRIP.AUTO: >=1.03
SQUAMOUS #/AREA URNS AUTO: ABNORMAL /HPF
STREP A PCR (OHS): NOT DETECTED
UROBILINOGEN UR STRIP-ACNC: 0.2 MG/DL
WBC #/AREA URNS AUTO: ABNORMAL /HPF

## 2023-02-26 PROCEDURE — 0240U COVID/FLU A&B PCR: CPT | Performed by: NURSE PRACTITIONER

## 2023-02-26 PROCEDURE — 87651 STREP A DNA AMP PROBE: CPT | Performed by: NURSE PRACTITIONER

## 2023-02-26 PROCEDURE — 99282 EMERGENCY DEPT VISIT SF MDM: CPT

## 2023-02-26 PROCEDURE — 81001 URINALYSIS AUTO W/SCOPE: CPT | Performed by: NURSE PRACTITIONER

## 2023-02-26 RX ORDER — IBUPROFEN 400 MG/1
800 TABLET ORAL
Status: DISCONTINUED | OUTPATIENT
Start: 2023-02-26 | End: 2023-02-26

## 2023-02-27 NOTE — ED PROVIDER NOTES
Encounter Date: 2/26/2023       History     Chief Complaint   Patient presents with    Fever     103.6 at home. Tylenol 1000mg taken at home aroudn 1700    Cough     Since yesterday     67-year-old female presents to the emergency department complaints of fever at home as well as cough.  Patient states the symptoms started 2 days ago.  She has had possible sick contacts.  Patient denies any other associated symptoms.  Patient denies any worsening or alleviating factors.    Review of patient's allergies indicates:   Allergen Reactions    Adhesive tape-silicones      Past Medical History:   Diagnosis Date    Arthritis     Breast cancer     Disorder of thyroid, unspecified     DM (diabetes mellitus)     Generalized anxiety disorder     HTN (hypertension)     Mixed hyperlipidemia     PONV (postoperative nausea and vomiting)     Pulmonary nodules     Thalassemia minor      Past Surgical History:   Procedure Laterality Date    CHOLECYSTECTOMY      HYSTERECTOMY       Family History   Problem Relation Age of Onset    Dementia Mother     Cancer Father     Thalassemia Sister     Diabetes Mellitus Brother     Asthma Brother      Social History     Tobacco Use    Smoking status: Never    Smokeless tobacco: Never   Substance Use Topics    Alcohol use: Never    Drug use: Never     Review of Systems   Constitutional:  Negative for activity change, appetite change and fever.   HENT:  Negative for congestion, dental problem and sore throat.    Eyes:  Negative for discharge and itching.   Respiratory:  Negative for apnea, chest tightness and shortness of breath.    Cardiovascular:  Negative for chest pain.   Gastrointestinal:  Negative for abdominal distention and nausea.   Genitourinary:  Negative for dysuria.   Musculoskeletal:  Negative for back pain.   Skin:  Negative for rash.   Neurological:  Negative for dizziness, facial asymmetry and weakness.   Hematological:  Does not bruise/bleed easily.   Psychiatric/Behavioral:   Negative for agitation and behavioral problems.    All other systems reviewed and are negative.    Physical Exam     Initial Vitals [02/26/23 1959]   BP Pulse Resp Temp SpO2   (!) 147/70 (!) 117 20 (!) 101.3 °F (38.5 °C) 97 %      MAP       --         Physical Exam    Nursing note and vitals reviewed.  Constitutional: Vital signs are normal. She appears well-developed and well-nourished.  Non-toxic appearance. She does not have a sickly appearance.   HENT:   Head: Normocephalic and atraumatic.   Right Ear: External ear normal.   Left Ear: External ear normal.   Eyes: Conjunctivae, EOM and lids are normal. Pupils are equal, round, and reactive to light. Lids are everted and swept, no foreign bodies found.   Neck: Trachea normal and phonation normal. Neck supple. No thyroid mass and no thyromegaly present.   Normal range of motion.   Full passive range of motion without pain.     Cardiovascular:  Normal rate, regular rhythm, S1 normal, S2 normal, normal heart sounds, intact distal pulses and normal pulses.           Pulmonary/Chest: Breath sounds normal. No respiratory distress.   Abdominal: Abdomen is soft.   Musculoskeletal:         General: No tenderness or edema.      Cervical back: Full passive range of motion without pain, normal range of motion and neck supple.     Lymphadenopathy:     She has no cervical adenopathy.   Neurological: She is alert and oriented to person, place, and time. She has normal strength.   Skin: Skin is warm, dry and intact. Capillary refill takes less than 2 seconds.   Psychiatric: She has a normal mood and affect. Her speech is normal and behavior is normal. Judgment normal. Cognition and memory are normal.       ED Course   Procedures  Labs Reviewed   COVID/FLU A&B PCR - Abnormal; Notable for the following components:       Result Value    SARS-CoV-2 PCR Detected (*)     All other components within normal limits    Narrative:     The Xpert Xpress SARS-CoV-2/FLU/RSV plus is a rapid,  multiplexed real-time PCR test intended for the simultaneous qualitative detection and differentiation of SARS-CoV-2, Influenza A, Influenza B, and respiratory syncytial virus (RSV) viral RNA in either nasopharyngeal swab or nasal swab specimens.         URINALYSIS, REFLEX TO URINE CULTURE - Abnormal; Notable for the following components:    Appearance, UA Slightly Cloudy (*)     Protein,  (*)     Glucose,  (*)     Ketones, UA Trace (*)     Blood, UA Moderate (*)     Bilirubin, UA Small (*)     All other components within normal limits   URINALYSIS, MICROSCOPIC - Abnormal; Notable for the following components:    Mucous, UA Trace (*)     All other components within normal limits   STREP GROUP A BY PCR - Normal    Narrative:     The Xpert Xpress Strep A test is a rapid, qualitative in vitro diagnostic test for the detection of Streptococcus pyogenes (Group A ß-hemolytic Streptococcus, Strep A) in throat swab specimens from patients with signs and symptoms of pharyngitis.            Imaging Results    None          Medications - No data to display                      Medical Decision Making  67-year-old female presents to the emergency department with complaints of sinus congestion cough and just generally not feeling well.  She states she feels like she has the flu.  Denies any other associated symptoms.  Denies any worsening or alleviating factors.    Problems Addressed:  COVID-19: acute illness or injury     Details: Discussed symptomatic treatment of COVID-19 symptoms with the patient.  Suggested Tylenol Motrin as well as over-the-counter medications for sinus drainage and cough.  Patient was aware of these plans and had no other questions or concerns prior to discharge.    Amount and/or Complexity of Data Reviewed  External Data Reviewed: notes.  Labs: ordered. Decision-making details documented in ED Course.  Discussion of management or test interpretation with external provider(s): Patient has  COVID-19.  Will discharged home with instructions to treat her symptoms with over-the-counter medication.  Patient was aware of the plan and had no other questions or concerns prior to discharge.            Clinical Impression:   Final diagnoses:  [U07.1] COVID-19 (Primary)        ED Disposition Condition    Discharge Stable          ED Prescriptions    None       Follow-up Information       Follow up With Specialties Details Why Contact Info    Sang Rodriguez MD Internal Medicine Call in 1 week As needed, For ER Follow Up. [address] [place] [address] [facility] [address]  [phone]               Louis Bales, [facility]  02/26/23 4155

## 2023-05-19 ENCOUNTER — OFFICE VISIT (OUTPATIENT)
Dept: HEMATOLOGY/ONCOLOGY | Facility: CLINIC | Age: 68
End: 2023-05-19
Payer: MEDICARE

## 2023-05-19 ENCOUNTER — LAB VISIT (OUTPATIENT)
Dept: LAB | Facility: HOSPITAL | Age: 68
End: 2023-05-19
Payer: MEDICARE

## 2023-05-19 VITALS
HEIGHT: 63 IN | RESPIRATION RATE: 18 BRPM | BODY MASS INDEX: 36.11 KG/M2 | WEIGHT: 203.81 LBS | TEMPERATURE: 98 F | DIASTOLIC BLOOD PRESSURE: 62 MMHG | OXYGEN SATURATION: 100 % | SYSTOLIC BLOOD PRESSURE: 100 MMHG | HEART RATE: 58 BPM

## 2023-05-19 DIAGNOSIS — D56.9 THALASSEMIA, UNSPECIFIED TYPE: ICD-10-CM

## 2023-05-19 DIAGNOSIS — Z17.0 MALIGNANT NEOPLASM OF RIGHT BREAST IN FEMALE, ESTROGEN RECEPTOR POSITIVE, UNSPECIFIED SITE OF BREAST: Primary | ICD-10-CM

## 2023-05-19 DIAGNOSIS — Z17.0 ER+ (ESTROGEN RECEPTOR POSITIVE STATUS): ICD-10-CM

## 2023-05-19 DIAGNOSIS — R53.83 FATIGUE, UNSPECIFIED TYPE: ICD-10-CM

## 2023-05-19 DIAGNOSIS — C50.911 MUCINOUS CARCINOMA OF RIGHT BREAST: ICD-10-CM

## 2023-05-19 DIAGNOSIS — C50.911 MALIGNANT NEOPLASM OF RIGHT BREAST IN FEMALE, ESTROGEN RECEPTOR POSITIVE, UNSPECIFIED SITE OF BREAST: ICD-10-CM

## 2023-05-19 DIAGNOSIS — R23.2 HOT FLASHES: ICD-10-CM

## 2023-05-19 DIAGNOSIS — Z17.0 MALIGNANT NEOPLASM OF RIGHT BREAST IN FEMALE, ESTROGEN RECEPTOR POSITIVE, UNSPECIFIED SITE OF BREAST: ICD-10-CM

## 2023-05-19 DIAGNOSIS — D05.11 DUCTAL CARCINOMA IN SITU (DCIS) OF RIGHT BREAST: ICD-10-CM

## 2023-05-19 DIAGNOSIS — Z12.31 BREAST CANCER SCREENING BY MAMMOGRAM: ICD-10-CM

## 2023-05-19 DIAGNOSIS — D50.9 MICROCYTIC ANEMIA: ICD-10-CM

## 2023-05-19 DIAGNOSIS — Z17.0 MALIGNANT NEOPLASM OF UPPER-OUTER QUADRANT OF BREAST IN FEMALE, ESTROGEN RECEPTOR POSITIVE, UNSPECIFIED LATERALITY: ICD-10-CM

## 2023-05-19 DIAGNOSIS — C50.419 MALIGNANT NEOPLASM OF UPPER-OUTER QUADRANT OF BREAST IN FEMALE, ESTROGEN RECEPTOR POSITIVE, UNSPECIFIED LATERALITY: ICD-10-CM

## 2023-05-19 DIAGNOSIS — C50.911 MALIGNANT NEOPLASM OF RIGHT BREAST IN FEMALE, ESTROGEN RECEPTOR POSITIVE, UNSPECIFIED SITE OF BREAST: Primary | ICD-10-CM

## 2023-05-19 LAB
ALBUMIN SERPL-MCNC: 3.8 G/DL (ref 3.4–4.8)
ALBUMIN/GLOB SERPL: 1.1 RATIO (ref 1.1–2)
ALP SERPL-CCNC: 54 UNIT/L (ref 40–150)
ALT SERPL-CCNC: 12 UNIT/L (ref 0–55)
AST SERPL-CCNC: 16 UNIT/L (ref 5–34)
BASOPHILS # BLD AUTO: 0.03 X10(3)/MCL
BASOPHILS NFR BLD AUTO: 0.3 %
BILIRUBIN DIRECT+TOT PNL SERPL-MCNC: 0.4 MG/DL
BUN SERPL-MCNC: 21 MG/DL (ref 9.8–20.1)
CALCIUM SERPL-MCNC: 10.2 MG/DL (ref 8.4–10.2)
CHLORIDE SERPL-SCNC: 104 MMOL/L (ref 98–107)
CO2 SERPL-SCNC: 27 MMOL/L (ref 23–31)
CREAT SERPL-MCNC: 0.82 MG/DL (ref 0.55–1.02)
EOSINOPHIL # BLD AUTO: 0.15 X10(3)/MCL (ref 0–0.9)
EOSINOPHIL NFR BLD AUTO: 1.6 %
ERYTHROCYTE [DISTWIDTH] IN BLOOD BY AUTOMATED COUNT: 18.4 % (ref 11.5–17)
GFR SERPLBLD CREATININE-BSD FMLA CKD-EPI: >60 MLS/MIN/1.73/M2
GLOBULIN SER-MCNC: 3.6 GM/DL (ref 2.4–3.5)
GLUCOSE SERPL-MCNC: 171 MG/DL (ref 82–115)
HCT VFR BLD AUTO: 35.2 % (ref 37–47)
HGB BLD-MCNC: 10.2 G/DL (ref 12–16)
IMM GRANULOCYTES # BLD AUTO: 0.03 X10(3)/MCL (ref 0–0.04)
IMM GRANULOCYTES NFR BLD AUTO: 0.3 %
LYMPHOCYTES # BLD AUTO: 2.14 X10(3)/MCL (ref 0.6–4.6)
LYMPHOCYTES NFR BLD AUTO: 23.5 %
MCH RBC QN AUTO: 19 PG (ref 27–31)
MCHC RBC AUTO-ENTMCNC: 29 G/DL (ref 33–36)
MCV RBC AUTO: 65.5 FL (ref 80–94)
MONOCYTES # BLD AUTO: 0.75 X10(3)/MCL (ref 0.1–1.3)
MONOCYTES NFR BLD AUTO: 8.2 %
NEUTROPHILS # BLD AUTO: 6.02 X10(3)/MCL (ref 2.1–9.2)
NEUTROPHILS NFR BLD AUTO: 66.1 %
PLATELET # BLD AUTO: 289 X10(3)/MCL (ref 130–400)
PMV BLD AUTO: 9.3 FL (ref 7.4–10.4)
POTASSIUM SERPL-SCNC: 4.3 MMOL/L (ref 3.5–5.1)
PROT SERPL-MCNC: 7.4 GM/DL (ref 5.8–7.6)
RBC # BLD AUTO: 5.37 X10(6)/MCL (ref 4.2–5.4)
SODIUM SERPL-SCNC: 140 MMOL/L (ref 136–145)
WBC # SPEC AUTO: 9.12 X10(3)/MCL (ref 4.5–11.5)

## 2023-05-19 PROCEDURE — 1159F PR MEDICATION LIST DOCUMENTED IN MEDICAL RECORD: ICD-10-PCS | Mod: CPTII,,,

## 2023-05-19 PROCEDURE — 3074F SYST BP LT 130 MM HG: CPT | Mod: CPTII,,,

## 2023-05-19 PROCEDURE — 1159F MED LIST DOCD IN RCRD: CPT | Mod: CPTII,,,

## 2023-05-19 PROCEDURE — 3074F PR MOST RECENT SYSTOLIC BLOOD PRESSURE < 130 MM HG: ICD-10-PCS | Mod: CPTII,,,

## 2023-05-19 PROCEDURE — 4010F PR ACE/ARB THEARPY RXD/TAKEN: ICD-10-PCS | Mod: CPTII,,,

## 2023-05-19 PROCEDURE — 99999 PR PBB SHADOW E&M-EST. PATIENT-LVL V: ICD-10-PCS | Mod: PBBFAC,,,

## 2023-05-19 PROCEDURE — 3008F BODY MASS INDEX DOCD: CPT | Mod: CPTII,,,

## 2023-05-19 PROCEDURE — 85025 COMPLETE CBC W/AUTO DIFF WBC: CPT

## 2023-05-19 PROCEDURE — 36415 COLL VENOUS BLD VENIPUNCTURE: CPT

## 2023-05-19 PROCEDURE — 99214 PR OFFICE/OUTPT VISIT, EST, LEVL IV, 30-39 MIN: ICD-10-PCS | Mod: ,,,

## 2023-05-19 PROCEDURE — 80053 COMPREHEN METABOLIC PANEL: CPT

## 2023-05-19 PROCEDURE — 4010F ACE/ARB THERAPY RXD/TAKEN: CPT | Mod: CPTII,,,

## 2023-05-19 PROCEDURE — 99999 PR PBB SHADOW E&M-EST. PATIENT-LVL V: CPT | Mod: PBBFAC,,,

## 2023-05-19 PROCEDURE — 3078F PR MOST RECENT DIASTOLIC BLOOD PRESSURE < 80 MM HG: ICD-10-PCS | Mod: CPTII,,,

## 2023-05-19 PROCEDURE — 1101F PR PT FALLS ASSESS DOC 0-1 FALLS W/OUT INJ PAST YR: ICD-10-PCS | Mod: CPTII,,,

## 2023-05-19 PROCEDURE — 3288F FALL RISK ASSESSMENT DOCD: CPT | Mod: CPTII,,,

## 2023-05-19 PROCEDURE — 3078F DIAST BP <80 MM HG: CPT | Mod: CPTII,,,

## 2023-05-19 PROCEDURE — 1160F RVW MEDS BY RX/DR IN RCRD: CPT | Mod: CPTII,,,

## 2023-05-19 PROCEDURE — 3288F PR FALLS RISK ASSESSMENT DOCUMENTED: ICD-10-PCS | Mod: CPTII,,,

## 2023-05-19 PROCEDURE — 1160F PR REVIEW ALL MEDS BY PRESCRIBER/CLIN PHARMACIST DOCUMENTED: ICD-10-PCS | Mod: CPTII,,,

## 2023-05-19 PROCEDURE — 1101F PT FALLS ASSESS-DOCD LE1/YR: CPT | Mod: CPTII,,,

## 2023-05-19 PROCEDURE — 99214 OFFICE O/P EST MOD 30 MIN: CPT | Mod: ,,,

## 2023-05-19 PROCEDURE — 3008F PR BODY MASS INDEX (BMI) DOCUMENTED: ICD-10-PCS | Mod: CPTII,,,

## 2023-05-19 RX ORDER — ANASTROZOLE 1 MG/1
1 TABLET ORAL DAILY
Qty: 30 TABLET | Refills: 5 | Status: SHIPPED | OUTPATIENT
Start: 2023-05-19 | End: 2024-01-17 | Stop reason: SDUPTHER

## 2023-05-19 NOTE — PROGRESS NOTES
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  female with past medical history significant for      Oncologic History Stage IA ([id number]), NVASIVE MUCINOUS CARCINOMA, GRADE 1 (0.8 CM), ER+99.5% NV+97.9% HER2-  DCIS, grade1    Oncologic Treatment ultrasound guided lumpectomy on 6/8/2022  treated with adjuvant XRT followed by AI       Pathology RIGHT BREAST LUMPECTOMY:     INVASIVE MUCINOUS CARCINOMA, LOYOLA-PHILLIPS GRADE 1 (0.8 CM).     CARCINOMA IS 1 MM FROM THE SUPERIOR MARGIN (NEW SUPERIOR MARGIN CLEAR, SEE SPECIMEN #3).     MINIMAL DUCTAL CARCINOMA IN SITU, LOW GRADE.           TUMOR        Histologic Type: Mucinous carcinoma        Histologic Grade (Daphne Histologic Score): Turlock Score        Glandular (Acinar) / Tubular Differentiation: Score 1 (greater than 75% of tumor area forming glandular / tubular structures)        Nuclear Pleomorphism: Score 1 (Nuclei small with little increase in size in comparison with normal breast epithelial cells, regular outlines, uniform nuclear chromatin, little variation in size)        Mitotic Rate: Score 1        Overall Grade: Grade 1 (scores of 3, 4 or 5)        Tumor Size: Greatest dimension of largest invasive focus greater than 1 mm (specify exact measurement in Millimeters (mm)): 8 mm        Tumor Focality: Single focus of invasive carcinoma        Ductal Carcinoma In Situ (DCIS): Present        Ductal Carcinoma In Situ (DCIS): Negative for extensive intraductal component (EIC)        Nuclear Grade: Grade I (low)         Lymphovascular Invasion: Not identified        Dermal Lymphovascular Invasion: No skin present        Treatment Effect in the Breast: No known presurgical therapy     MARGINS        Margin Status for Invasive Carcinoma: All margins negative for invasive carcinoma        Distance from Invasive Carcinoma to Closest Margin: Greater than: 10 mm        Closest Margin(s) to Invasive Carcinoma: Superior        Margin Status for DCIS: All margins negative for DCIS        Distance from DCIS to Closest Margin: Greater than: 10 mm        Closest Margin(s) to DCIS: Superior     REGIONAL LYMPH NODES        Regional Lymph Node Status: Regional lymph nodes present             All regional lymph nodes negative for tumor        Total Number of Lymph Nodes Examined (sentinel and non-sentinel): Exact number : 10        Number of Nursery Nodes Examined: Exact number : 4       Interval History:   She returns to the clinic today for a three month follow-up regarding her history of right breast cancer. She continues to take her anastrozole daily, with minimal side effects. She does have occasional hot flashes that are mild and tolerable. She continues to have chronic fatigue that is stable and unchanged.  Bilateral diagnostic mammo was completed 5/15/2023 which showed no evidence of malignancy. An annual follow-up was recommended. She denies any vaginal dryness, mood swings, joint pain, headache, or swelling.     Past Medical History:   Diagnosis Date    Arthritis     Breast cancer     Disorder of thyroid, unspecified     DM (diabetes mellitus)     Generalized anxiety disorder     HTN (hypertension)     Mixed hyperlipidemia     PONV (postoperative nausea and vomiting)     Pulmonary nodules     Thalassemia minor       Past Surgical History:   Procedure Laterality Date    CHOLECYSTECTOMY      HYSTERECTOMY       Social History     Socioeconomic History    Marital status:    Tobacco Use    Smoking status: Never    Smokeless tobacco:  Never   Substance and Sexual Activity    Alcohol use: Never    Drug use: Never      Family History   Problem Relation Age of Onset    Dementia Mother     Cancer Father     Thalassemia Sister     Diabetes Mellitus Brother     Asthma Brother       Review of patient's allergies indicates:   Allergen Reactions    Adhesive tape-silicones       Review of Systems   Constitutional:  Positive for fatigue (chronic). Negative for activity change, chills and fever.   HENT: Negative.     Eyes: Negative.    Respiratory:  Negative for cough and shortness of breath.    Cardiovascular:  Negative for chest pain and leg swelling.   Gastrointestinal:  Negative for constipation, diarrhea, nausea and vomiting.   Endocrine: Negative.    Genitourinary: Negative.    Musculoskeletal:  Negative for arthralgias and myalgias.   Integumentary:  Negative.   Allergic/Immunologic: Negative.    Neurological:  Negative for light-headedness, numbness and headaches.   Hematological: Negative.    Psychiatric/Behavioral: Negative.         Objective:        Vitals:    05/19/23 0822   BP: 100/62   Pulse: (!) 58   Resp: 18   Temp: 97.6 °F (36.4 °C)        Physical Exam  Constitutional:       General: She is not in acute distress.     Appearance: She is well-developed. She is not ill-appearing.   HENT:      Head: Normocephalic and atraumatic.      Mouth/Throat:      Mouth: Mucous membranes are moist.   Eyes:      Extraocular Movements: Extraocular movements intact.      Conjunctiva/sclera: Conjunctivae normal.   Cardiovascular:      Rate and Rhythm: Normal rate and regular rhythm.      Heart sounds: Normal heart sounds.   Pulmonary:      Effort: Pulmonary effort is normal. No respiratory distress.      Breath sounds: Normal breath sounds. No wheezing.   Chest:      Comments: Well healed right lumpectomy. She does have notable scar tissue to lumpectomy site, with mild tenderness to right axillary incision, that is unchanged. No abnormal masses, rash,  discharge, or nipple inversion bilaterally.   Abdominal:      General: Bowel sounds are normal. There is no distension.      Palpations: Abdomen is soft.      Tenderness: There is no abdominal tenderness.   Musculoskeletal:         General: Normal range of motion.      Cervical back: Normal range of motion and neck supple.   Skin:     General: Skin is warm.   Neurological:      General: No focal deficit present.      Mental Status: She is alert and oriented to person, place, and time. Mental status is at baseline.      Cranial Nerves: No cranial nerve deficit.   Psychiatric:         Mood and Affect: Mood normal.         LABS / IMAGING      - 12/12/2022 mammogram:  There is an ovoid shaped mass with mostly smooth but partially obscured margins located in the right upper quadrant corresponding to a seroma associated with the surgical scar.  The margin has a somewhat irregularly thickened margin measuring 1-3 mm in thickness and there are focal areas of thin septations associated with the internal margin of this fluid collection.  It measures approximately 4.6 x 2.8 x 6.7 cm.  This shows a typical appearance of a postsurgical/post radiation seroma.  Recommend a repeat mammogram in 6 months      PATHOLOGY   - 06/19/2022: Mucinous carcinoma; Grade 1; 8 mm; LN 0/10, ER 99%, MA 97%, HER2 1+,     ONCOTYPE Dx:  07/18/2022:  Proprietary recurrent score of 9, distance recurrence at 9 year bella at 3% with antihormone treatment, and benefit from chemotherapy less than 1%.    Assessment:     ECOG Performance status: 0      Stage IA ([id number]), INVASIVE MUCINOUS CARCINOMA, GRADE 1 (0.8 CM), ER+99.5% MA+97.9% HER2- DCIS, grade1  ultrasound guided lumpectomy on 6/8/2022  s/p adjuvant XRT - 08/2022.    Aromasin 1mg daily started 1/28/2023  Tolerating well, without any significant side effects. She does report mild, occasional hot flashes. Continue therapy at this time.  Mammogram   Bilateral diagnostic mammogram completed 05/15/2023  "showed no evidence of malignancy.  Routine screening mammogram recommended in 12 months.  Order placed today to be completed 5/15/2024  Bone density  DEXA completed 1/27/2023 showed normal bone density to both spine and hip.   FU DEXA due 1/28/2025.  Currently only taking vitamin-D supplement due to history of elevated calcium.  Anemia   Hx of thalassemia, managed through Dr. Chun. She does report that she "takes oral iron when needed". Iron studies are stable.         Plan:   Labs stable   Continue Anastrozole daily.  Annual mammo ordered to be completed 05/15/2024.  RTC in 4 months with NP for FU/lab    The patient was seen, interviewed and examined. Pertinent lab and radiology studies were reviewed. Pt instructed to call should develop concerning signs/symptoms or have further questions.       Portions of the record may have been created with voice recognition software. Occasional wrong-word or sound-a-like substitutions may have occurred due to the inherent limitations of voice recognition software. Read the chart carefully and recognize, using context, where substitutions have occurred.    Kitty Lyman, JANETHP-C  Oncology/Hematology   [facility]         "

## 2023-08-15 ENCOUNTER — OFFICE VISIT (OUTPATIENT)
Dept: SURGICAL ONCOLOGY | Facility: CLINIC | Age: 68
End: 2023-08-15
Payer: MEDICARE

## 2023-08-15 VITALS
BODY MASS INDEX: 36.39 KG/M2 | SYSTOLIC BLOOD PRESSURE: 143 MMHG | WEIGHT: 205.38 LBS | HEART RATE: 59 BPM | DIASTOLIC BLOOD PRESSURE: 76 MMHG | HEIGHT: 63 IN

## 2023-08-15 DIAGNOSIS — Z85.3 PERSONAL HISTORY OF BREAST CANCER: Primary | ICD-10-CM

## 2023-08-15 DIAGNOSIS — C50.911 MALIGNANT NEOPLASM OF RIGHT FEMALE BREAST, UNSPECIFIED ESTROGEN RECEPTOR STATUS, UNSPECIFIED SITE OF BREAST: Primary | ICD-10-CM

## 2023-08-15 PROCEDURE — 3008F BODY MASS INDEX DOCD: CPT | Mod: CPTII,S$GLB,, | Performed by: SURGERY

## 2023-08-15 PROCEDURE — 3008F PR BODY MASS INDEX (BMI) DOCUMENTED: ICD-10-PCS | Mod: CPTII,S$GLB,, | Performed by: SURGERY

## 2023-08-15 PROCEDURE — 99214 PR OFFICE/OUTPT VISIT, EST, LEVL IV, 30-39 MIN: ICD-10-PCS | Mod: S$GLB,,, | Performed by: SURGERY

## 2023-08-15 PROCEDURE — 3288F FALL RISK ASSESSMENT DOCD: CPT | Mod: CPTII,S$GLB,, | Performed by: SURGERY

## 2023-08-15 PROCEDURE — 3078F PR MOST RECENT DIASTOLIC BLOOD PRESSURE < 80 MM HG: ICD-10-PCS | Mod: CPTII,S$GLB,, | Performed by: SURGERY

## 2023-08-15 PROCEDURE — 1126F AMNT PAIN NOTED NONE PRSNT: CPT | Mod: CPTII,S$GLB,, | Performed by: SURGERY

## 2023-08-15 PROCEDURE — 3077F PR MOST RECENT SYSTOLIC BLOOD PRESSURE >= 140 MM HG: ICD-10-PCS | Mod: CPTII,S$GLB,, | Performed by: SURGERY

## 2023-08-15 PROCEDURE — 99999 PR PBB SHADOW E&M-EST. PATIENT-LVL III: CPT | Mod: PBBFAC,,, | Performed by: SURGERY

## 2023-08-15 PROCEDURE — 1126F PR PAIN SEVERITY QUANTIFIED, NO PAIN PRESENT: ICD-10-PCS | Mod: CPTII,S$GLB,, | Performed by: SURGERY

## 2023-08-15 PROCEDURE — 1159F PR MEDICATION LIST DOCUMENTED IN MEDICAL RECORD: ICD-10-PCS | Mod: CPTII,S$GLB,, | Performed by: SURGERY

## 2023-08-15 PROCEDURE — 4010F PR ACE/ARB THEARPY RXD/TAKEN: ICD-10-PCS | Mod: CPTII,S$GLB,, | Performed by: SURGERY

## 2023-08-15 PROCEDURE — 1159F MED LIST DOCD IN RCRD: CPT | Mod: CPTII,S$GLB,, | Performed by: SURGERY

## 2023-08-15 PROCEDURE — 3288F PR FALLS RISK ASSESSMENT DOCUMENTED: ICD-10-PCS | Mod: CPTII,S$GLB,, | Performed by: SURGERY

## 2023-08-15 PROCEDURE — 3078F DIAST BP <80 MM HG: CPT | Mod: CPTII,S$GLB,, | Performed by: SURGERY

## 2023-08-15 PROCEDURE — 99214 OFFICE O/P EST MOD 30 MIN: CPT | Mod: S$GLB,,, | Performed by: SURGERY

## 2023-08-15 PROCEDURE — 1101F PR PT FALLS ASSESS DOC 0-1 FALLS W/OUT INJ PAST YR: ICD-10-PCS | Mod: CPTII,S$GLB,, | Performed by: SURGERY

## 2023-08-15 PROCEDURE — 1101F PT FALLS ASSESS-DOCD LE1/YR: CPT | Mod: CPTII,S$GLB,, | Performed by: SURGERY

## 2023-08-15 PROCEDURE — 4010F ACE/ARB THERAPY RXD/TAKEN: CPT | Mod: CPTII,S$GLB,, | Performed by: SURGERY

## 2023-08-15 PROCEDURE — 3077F SYST BP >= 140 MM HG: CPT | Mod: CPTII,S$GLB,, | Performed by: SURGERY

## 2023-08-15 PROCEDURE — 99999 PR PBB SHADOW E&M-EST. PATIENT-LVL III: ICD-10-PCS | Mod: PBBFAC,,, | Performed by: SURGERY

## 2023-08-15 NOTE — PROGRESS NOTES
Chief complaint:  Follow-up breast cancer     HPI:  Patient returns for follow-up of Stage IA ([id number]), INVASIVE MUCINOUS CARCINOMA, GRADE 1 (0.8 CM), ER+ RI+ ; HER2 1+, DCIS, grade1, status post lumpectomy June 8, 2022 followed by adjuvant radiation therapy.  She is set to begin antiestrogen therapy.She denies breast masses, skin changes, nipple inversion or discharge on self-breast exam.    Recent surveillance mammography was reviewed, I personally interpreted the images and reviewed the report.  I discussed with the patient in detail and questions were addressed.  There are no suspicious findings.    Greater than 30 minutes was required for complete chart review, imaging review patient consultation and documentation        Past Medical and Surgical History  Allergies :   Adhesive tape-silicones    [facility]  Medical :   She has a past medical history of Arthritis, Breast cancer, Disorder of thyroid, unspecified, DM (diabetes mellitus), Generalized anxiety disorder, HTN (hypertension), Mixed hyperlipidemia, PONV (postoperative nausea and vomiting), Pulmonary nodules, and Thalassemia minor.    Surgical :   She has a past surgical history that includes Cholecystectomy and Hysterectomy.     Family History  Her family history includes Asthma in her brother; Cancer in her father; Dementia in her mother; Diabetes Mellitus in her brother; Thalassemia in her sister.    Social History  She reports that she has never smoked. She has never used smokeless tobacco. She reports that she does not drink alcohol and does not use drugs.     Review of Systems   Constitutional:  Negative for appetite change, chills, diaphoresis and fever.   HENT:  Negative for congestion, drooling, ear discharge, ear pain and hearing loss.    Eyes:  Negative for discharge.   Respiratory:  Negative for apnea, cough, choking, chest tightness, shortness of breath and stridor.    Cardiovascular:  Negative for chest pain, palpitations and leg swelling.    Endocrine: Negative for cold intolerance and heat intolerance.   Genitourinary:  Negative for difficulty urinating, dyspareunia, dysuria and hematuria.   Musculoskeletal:  Negative for arthralgias, gait problem and joint swelling.   Skin:  Negative for color change and rash.   Neurological:  Negative for dizziness, tremors, seizures, syncope, facial asymmetry, speech difficulty, light-headedness, numbness and headaches.   Psychiatric/Behavioral:  Negative for agitation and confusion.         Objective   Physical Exam  Vitals and nursing note reviewed.   Constitutional:       General: She is not in acute distress.     Appearance: Normal appearance. She is normal weight. She is not ill-appearing, toxic-appearing or diaphoretic.   HENT:      Head: Normocephalic and atraumatic.      Right Ear: External ear normal.      Left Ear: External ear normal.      Nose: Nose normal.      Mouth/Throat:      Mouth: Mucous membranes are moist.      Pharynx: Oropharynx is clear.   Eyes:      General: No scleral icterus.     Extraocular Movements: Extraocular movements intact.      Conjunctiva/sclera: Conjunctivae normal.      Pupils: Pupils are equal, round, and reactive to light.   Cardiovascular:      Rate and Rhythm: Normal rate and regular rhythm.      Pulses: Normal pulses.      Heart sounds: Normal heart sounds. No murmur heard.     No friction rub. No gallop.   Pulmonary:      Effort: Pulmonary effort is normal. No respiratory distress.      Breath sounds: Normal breath sounds. No stridor. No wheezing or rhonchi.   Chest:      Chest wall: No tenderness.   Breasts:     Right: No swelling, bleeding, inverted nipple, mass, nipple discharge, skin change or tenderness.      Left: No swelling, bleeding, inverted nipple, mass, nipple discharge, skin change or tenderness.   Abdominal:      General: Abdomen is flat. There is no distension.      Palpations: Abdomen is soft. There is no mass.      Tenderness: There is no abdominal  "tenderness. There is no right CVA tenderness, left CVA tenderness, guarding or rebound.      Hernia: No hernia is present.   Musculoskeletal:         General: No swelling, tenderness, deformity or signs of injury. Normal range of motion.      Cervical back: Normal range of motion and neck supple. No rigidity or tenderness.      Right lower leg: No edema.      Left lower leg: No edema.   Lymphadenopathy:      Cervical: No cervical adenopathy.      Upper Body:      Right upper body: No supraclavicular or axillary adenopathy.      Left upper body: No supraclavicular or axillary adenopathy.   Skin:     General: Skin is warm.      Capillary Refill: Capillary refill takes less than 2 seconds.      Coloration: Skin is not jaundiced or pale.      Findings: No bruising, erythema, lesion or rash.   Neurological:      General: No focal deficit present.      Mental Status: She is alert and oriented to person, place, and time. Mental status is at baseline.      Cranial Nerves: No cranial nerve deficit.      Sensory: No sensory deficit.      Motor: No weakness.      Coordination: Coordination normal.      Gait: Gait normal.   Psychiatric:         Mood and Affect: Mood normal.         Behavior: Behavior normal.         Thought Content: Thought content normal.         Judgment: Judgment normal.       VITAL SIGNS: 24 HR MIN & MAX LAST    @FLOWSTAT(6:24::1)@           @FLOWSTAT(5:24::1)@  (!) 143/76     @FLOWSTAT(8:24::1)@  (!) 59     @FLOWSTAT(9:24::1)@       @FLOWSTAT(10:24::1)@         HT: 5' 3" (160 cm)  WT: 93.2 kg (205 lb 6.4 oz)  BMI: 36.4       Assessment & Plan     Stage I right breast cancer status post breast conserving therapy, lumpectomy June 2022    No evidence of disease    Return to clinic in 6 months with right diagnostic mammography        "

## 2023-08-16 ENCOUNTER — DOCUMENTATION ONLY (OUTPATIENT)
Dept: SURGICAL ONCOLOGY | Facility: CLINIC | Age: 68
End: 2023-08-16

## 2023-08-16 LAB — BCS RECOMMENDATION EXT: NORMAL

## 2023-09-26 ENCOUNTER — OFFICE VISIT (OUTPATIENT)
Dept: HEMATOLOGY/ONCOLOGY | Facility: CLINIC | Age: 68
End: 2023-09-26
Payer: MEDICARE

## 2023-09-26 VITALS
TEMPERATURE: 98 F | OXYGEN SATURATION: 99 % | HEIGHT: 63 IN | BODY MASS INDEX: 37.28 KG/M2 | WEIGHT: 210.38 LBS | RESPIRATION RATE: 18 BRPM | DIASTOLIC BLOOD PRESSURE: 82 MMHG | SYSTOLIC BLOOD PRESSURE: 146 MMHG | HEART RATE: 56 BPM

## 2023-09-26 DIAGNOSIS — C50.911 MALIGNANT NEOPLASM OF RIGHT BREAST IN FEMALE, ESTROGEN RECEPTOR POSITIVE, UNSPECIFIED SITE OF BREAST: Primary | ICD-10-CM

## 2023-09-26 DIAGNOSIS — D50.9 MICROCYTIC ANEMIA: ICD-10-CM

## 2023-09-26 DIAGNOSIS — Z17.0 MALIGNANT NEOPLASM OF UPPER-OUTER QUADRANT OF BREAST IN FEMALE, ESTROGEN RECEPTOR POSITIVE, UNSPECIFIED LATERALITY: ICD-10-CM

## 2023-09-26 DIAGNOSIS — C50.911 MUCINOUS CARCINOMA OF RIGHT BREAST: ICD-10-CM

## 2023-09-26 DIAGNOSIS — Z79.811 AROMATASE INHIBITOR USE: ICD-10-CM

## 2023-09-26 DIAGNOSIS — M80.08XA AGE-RELATED OSTEOPOROSIS WITH CURRENT PATHOLOGICAL FRACTURE, VERTEBRA(E), INITIAL ENCOUNTER FOR FRACTURE: ICD-10-CM

## 2023-09-26 DIAGNOSIS — Z17.0 MALIGNANT NEOPLASM OF RIGHT BREAST IN FEMALE, ESTROGEN RECEPTOR POSITIVE, UNSPECIFIED SITE OF BREAST: Primary | ICD-10-CM

## 2023-09-26 DIAGNOSIS — Z17.0 ER+ (ESTROGEN RECEPTOR POSITIVE STATUS): ICD-10-CM

## 2023-09-26 DIAGNOSIS — D56.9 THALASSEMIA, UNSPECIFIED TYPE: ICD-10-CM

## 2023-09-26 DIAGNOSIS — R23.2 HOT FLASHES: ICD-10-CM

## 2023-09-26 DIAGNOSIS — C50.419 MALIGNANT NEOPLASM OF UPPER-OUTER QUADRANT OF BREAST IN FEMALE, ESTROGEN RECEPTOR POSITIVE, UNSPECIFIED LATERALITY: ICD-10-CM

## 2023-09-26 DIAGNOSIS — D05.11 DUCTAL CARCINOMA IN SITU (DCIS) OF RIGHT BREAST: ICD-10-CM

## 2023-09-26 DIAGNOSIS — R53.83 FATIGUE, UNSPECIFIED TYPE: ICD-10-CM

## 2023-09-26 PROCEDURE — 3288F FALL RISK ASSESSMENT DOCD: CPT | Mod: CPTII,S$GLB,,

## 2023-09-26 PROCEDURE — 1126F AMNT PAIN NOTED NONE PRSNT: CPT | Mod: CPTII,S$GLB,,

## 2023-09-26 PROCEDURE — 1160F RVW MEDS BY RX/DR IN RCRD: CPT | Mod: CPTII,S$GLB,,

## 2023-09-26 PROCEDURE — 3079F DIAST BP 80-89 MM HG: CPT | Mod: CPTII,S$GLB,,

## 2023-09-26 PROCEDURE — 3077F SYST BP >= 140 MM HG: CPT | Mod: CPTII,S$GLB,,

## 2023-09-26 PROCEDURE — 3008F BODY MASS INDEX DOCD: CPT | Mod: CPTII,S$GLB,,

## 2023-09-26 PROCEDURE — 3288F PR FALLS RISK ASSESSMENT DOCUMENTED: ICD-10-PCS | Mod: CPTII,S$GLB,,

## 2023-09-26 PROCEDURE — 99215 OFFICE O/P EST HI 40 MIN: CPT | Mod: S$GLB,,,

## 2023-09-26 PROCEDURE — 1101F PT FALLS ASSESS-DOCD LE1/YR: CPT | Mod: CPTII,S$GLB,,

## 2023-09-26 PROCEDURE — 99215 PR OFFICE/OUTPT VISIT, EST, LEVL V, 40-54 MIN: ICD-10-PCS | Mod: S$GLB,,,

## 2023-09-26 PROCEDURE — 3079F PR MOST RECENT DIASTOLIC BLOOD PRESSURE 80-89 MM HG: ICD-10-PCS | Mod: CPTII,S$GLB,,

## 2023-09-26 PROCEDURE — 3008F PR BODY MASS INDEX (BMI) DOCUMENTED: ICD-10-PCS | Mod: CPTII,S$GLB,,

## 2023-09-26 PROCEDURE — 1160F PR REVIEW ALL MEDS BY PRESCRIBER/CLIN PHARMACIST DOCUMENTED: ICD-10-PCS | Mod: CPTII,S$GLB,,

## 2023-09-26 PROCEDURE — 1159F PR MEDICATION LIST DOCUMENTED IN MEDICAL RECORD: ICD-10-PCS | Mod: CPTII,S$GLB,,

## 2023-09-26 PROCEDURE — 1159F MED LIST DOCD IN RCRD: CPT | Mod: CPTII,S$GLB,,

## 2023-09-26 PROCEDURE — 1101F PR PT FALLS ASSESS DOC 0-1 FALLS W/OUT INJ PAST YR: ICD-10-PCS | Mod: CPTII,S$GLB,,

## 2023-09-26 PROCEDURE — 3077F PR MOST RECENT SYSTOLIC BLOOD PRESSURE >= 140 MM HG: ICD-10-PCS | Mod: CPTII,S$GLB,,

## 2023-09-26 PROCEDURE — 1126F PR PAIN SEVERITY QUANTIFIED, NO PAIN PRESENT: ICD-10-PCS | Mod: CPTII,S$GLB,,

## 2023-09-26 PROCEDURE — 99999 PR PBB SHADOW E&M-EST. PATIENT-LVL IV: ICD-10-PCS | Mod: PBBFAC,,,

## 2023-09-26 PROCEDURE — 99999 PR PBB SHADOW E&M-EST. PATIENT-LVL IV: CPT | Mod: PBBFAC,,,

## 2023-09-26 PROCEDURE — 4010F ACE/ARB THERAPY RXD/TAKEN: CPT | Mod: CPTII,S$GLB,,

## 2023-09-26 PROCEDURE — 4010F PR ACE/ARB THEARPY RXD/TAKEN: ICD-10-PCS | Mod: CPTII,S$GLB,,

## 2023-09-26 RX ORDER — FERROUS SULFATE TAB 325 MG (65 MG ELEMENTAL FE) 325 (65 FE) MG
TAB ORAL
COMMUNITY
Start: 2023-06-26

## 2023-09-26 NOTE — PROGRESS NOTES
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  female with past medical history significant for      Oncologic History Stage IA ([id number]), NVASIVE MUCINOUS CARCINOMA, GRADE 1 (0.8 CM), ER+99.5% IA+97.9% HER2-  DCIS, grade1    Oncologic Treatment ultrasound guided lumpectomy on 6/8/2022  treated with adjuvant XRT followed by AI       Pathology RIGHT BREAST LUMPECTOMY:     INVASIVE MUCINOUS CARCINOMA, LOYOLA-PHILLIPS GRADE 1 (0.8 CM).     CARCINOMA IS 1 MM FROM THE SUPERIOR MARGIN (NEW SUPERIOR MARGIN CLEAR, SEE SPECIMEN #3).     MINIMAL DUCTAL CARCINOMA IN SITU, LOW GRADE.           TUMOR        Histologic Type: Mucinous carcinoma        Histologic Grade (Slick Histologic Score): Slick Score        Glandular (Acinar) / Tubular Differentiation: Score 1 (greater than 75% of tumor area forming glandular / tubular structures)        Nuclear Pleomorphism: Score 1 (Nuclei small with little increase in size in comparison with normal breast epithelial cells, regular outlines, uniform nuclear chromatin, little variation in size)        Mitotic Rate: Score 1        Overall Grade: Grade 1 (scores of 3, 4 or 5)        Tumor Size: Greatest dimension of largest invasive focus greater than 1 mm (specify exact measurement in Millimeters (mm)): 8 mm        Tumor Focality: Single focus of invasive carcinoma        Ductal Carcinoma In Situ (DCIS): Present        Ductal Carcinoma In Situ (DCIS): Negative for extensive intraductal component (EIC)        Nuclear Grade: Grade I (low)        Lymphovascular Invasion:  Not identified        Dermal Lymphovascular Invasion: No skin present        Treatment Effect in the Breast: No known presurgical therapy     MARGINS        Margin Status for Invasive Carcinoma: All margins negative for invasive carcinoma        Distance from Invasive Carcinoma to Closest Margin: Greater than: 10 mm        Closest Margin(s) to Invasive Carcinoma: Superior        Margin Status for DCIS: All margins negative for DCIS        Distance from DCIS to Closest Margin: Greater than: 10 mm        Closest Margin(s) to DCIS: Superior     REGIONAL LYMPH NODES        Regional Lymph Node Status: Regional lymph nodes present             All regional lymph nodes negative for tumor        Total Number of Lymph Nodes Examined (sentinel and non-sentinel): Exact number : 10        Number of [place] Nodes Examined: Exact number : 4       Interval History:   She returns to the clinic today for a four month follow-up regarding her history of right breast cancer. She continues to take her anastrozole daily, with minimal side effects. She does have occasional hot flashes that are mild and tolerable.  She does have vaginal dryness.  She continues to have chronic fatigue that is stable and unchanged.  Bilateral diagnostic mammo was completed 5/15/2023 which showed no evidence of malignancy. An annual follow-up was recommended. She was seen by Dr. Osman on 8/15/2023, with a follow-up in 6 months.  Mammogram currently scheduled for 06/2024.  She denies any mood swings, joint pain, headache, or swelling.     Past Medical History:   Diagnosis Date    Arthritis     Breast cancer     Disorder of thyroid, unspecified     DM (diabetes mellitus)     Generalized anxiety disorder     HTN (hypertension)     Mixed hyperlipidemia     PONV (postoperative nausea and vomiting)     Pulmonary nodules     Thalassemia minor       Past Surgical History:   Procedure Laterality Date    CHOLECYSTECTOMY      HYSTERECTOMY       Social History      Socioeconomic History    Marital status:    Tobacco Use    Smoking status: Never    Smokeless tobacco: Never   Substance and Sexual Activity    Alcohol use: Never    Drug use: Never      Family History   Problem Relation Age of Onset    Dementia Mother     Cancer Father     Thalassemia Sister     Diabetes Mellitus Brother     Asthma Brother       Review of patient's allergies indicates:   Allergen Reactions    Adhesive tape-silicones       Review of Systems   Constitutional:  Positive for fatigue (chronic). Negative for activity change, chills and fever.   HENT: Negative.     Eyes: Negative.    Respiratory:  Negative for cough and shortness of breath.    Cardiovascular:  Negative for chest pain and leg swelling.   Gastrointestinal:  Negative for constipation, diarrhea, nausea and vomiting.   Endocrine: Negative.    Genitourinary: Negative.    Musculoskeletal:  Negative for arthralgias and myalgias.   Integumentary:  Negative.   Allergic/Immunologic: Negative.    Neurological:  Negative for light-headedness, numbness and headaches.   Hematological: Negative.    Psychiatric/Behavioral: Negative.           Objective:        Vitals:    09/26/23 1338   BP: (!) 146/82   Pulse: (!) 56   Resp: 18   Temp: 97.7 °F (36.5 °C)          Physical Exam  Constitutional:       General: She is not in acute distress.     Appearance: She is well-developed. She is not ill-appearing.   HENT:      Head: Normocephalic and atraumatic.      Mouth/Throat:      Mouth: Mucous membranes are moist.   Eyes:      Extraocular Movements: Extraocular movements intact.      Conjunctiva/sclera: Conjunctivae normal.   Cardiovascular:      Rate and Rhythm: Normal rate and regular rhythm.      Heart sounds: Normal heart sounds.   Pulmonary:      Effort: Pulmonary effort is normal. No respiratory distress.      Breath sounds: Normal breath sounds. No wheezing.   Chest:      Comments: Well healed right lumpectomy. She does have notable scar tissue  to lumpectomy site, with mild tenderness to right axillary incision, that is unchanged. No abnormal masses, rash, discharge, or nipple inversion bilaterally.   Abdominal:      General: Bowel sounds are normal. There is no distension.      Palpations: Abdomen is soft.      Tenderness: There is no abdominal tenderness.   Musculoskeletal:         General: Normal range of motion.      Cervical back: Normal range of motion and neck supple.   Skin:     General: Skin is warm.   Neurological:      General: No focal deficit present.      Mental Status: She is alert and oriented to person, place, and time. Mental status is at baseline.      Cranial Nerves: No cranial nerve deficit.   Psychiatric:         Mood and Affect: Mood normal.           LABS / IMAGING      - 12/12/2022 mammogram:  There is an ovoid shaped mass with mostly smooth but partially obscured margins located in the right upper quadrant corresponding to a seroma associated with the surgical scar.  The margin has a somewhat irregularly thickened margin measuring 1-3 mm in thickness and there are focal areas of thin septations associated with the internal margin of this fluid collection.  It measures approximately 4.6 x 2.8 x 6.7 cm.  This shows a typical appearance of a postsurgical/post radiation seroma.  Recommend a repeat mammogram in 6 months      PATHOLOGY   - 06/19/2022: Mucinous carcinoma; Grade 1; 8 mm; LN 0/10, ER 99%, OR 97%, HER2 1+,     ONCOTYPE Dx:  07/18/2022:  Proprietary recurrent score of 9, distance recurrence at 9 year bella at 3% with antihormone treatment, and benefit from chemotherapy less than 1%.    Assessment:     ECOG Performance status: 0      Stage IA ([id number]), INVASIVE MUCINOUS CARCINOMA, GRADE 1 (0.8 CM), ER+99.5% OR+97.9% HER2- DCIS, grade1  ultrasound guided lumpectomy on 6/8/2022  s/p adjuvant XRT - 08/2022.    Aromasin 1mg daily started 1/28/2023  Tolerating well, without any significant side effects. She does report mild,  "occasional hot flashes. Continue therapy at this time.  Mammogram   Bilateral diagnostic mammogram completed 05/15/2023 showed no evidence of malignancy.  Routine screening mammogram recommended in 12 months.  Mammogram scheduled for 5/15/2024  Bone density  DEXA completed 1/27/2023 showed normal bone density to both spine and hip.   FU DEXA due 1/28/2025.  Currently only taking vitamin-D supplement due to history of elevated calcium.  Anemia   Hx of thalassemia, managed through Dr. Chun. She does report that she "takes oral iron when needed". Iron studies are stable.   Vaginal dryness  Replens daily as well as lubricants as needed.        Plan:   Labs and exam stable   Continue Anastrozole daily.  Continue follow-ups with breast surgery as scheduled.  Annual mammo scheduled for 05/15/2024.  RTC in 4 months with NP for FU/lab/CBE    The patient was seen, interviewed and examined. Pertinent lab and radiology studies were reviewed. Pt instructed to call should develop concerning signs/symptoms or have further questions.       Portions of the record may have been created with voice recognition software. Occasional wrong-word or sound-a-like substitutions may have occurred due to the inherent limitations of voice recognition software. Read the chart carefully and recognize, using context, where substitutions have occurred.    Kitty Lyman, JANETHP-C  Oncology/Hematology   [facility]         "

## 2024-01-05 ENCOUNTER — DOCUMENTATION ONLY (OUTPATIENT)
Dept: SURGICAL ONCOLOGY | Facility: CLINIC | Age: 69
End: 2024-01-05
Payer: MEDICARE

## 2024-01-11 ENCOUNTER — DOCUMENTATION ONLY (OUTPATIENT)
Dept: SURGICAL ONCOLOGY | Facility: CLINIC | Age: 69
End: 2024-01-11

## 2024-01-11 ENCOUNTER — TELEPHONE (OUTPATIENT)
Dept: SURGICAL ONCOLOGY | Facility: CLINIC | Age: 69
End: 2024-01-11

## 2024-01-11 NOTE — PROGRESS NOTES
Spoke with Susana Madrid RN regarding patients mammograms. She stated that if the patient is addiment about not doing the mammo in February then she doesn't have to and to just do diagnostic bilateral mammogram in June when she does instead of the screening. I notified Eri of this and will fax over a new order for bilateral diagnostic mammo to be done in June. cpl

## 2024-01-11 NOTE — TELEPHONE ENCOUNTER
Phone call received from Eri yesterday, 01/10. She stated that they tried squeezing the patient in for February and when they called to schedule her MMG, she refeused. She stated she just did a mammo in November and didn't want to do this one. She is scheduled for a screening mammo in June 2024. Eri stated if it needs to be changed to diagnostic, to give her a call and she'll change it. Otherwise, if it is indeed needing to be a screening, to re-fax an order. I told her that Susana Madrid RN was out this week but as soon as I hear something next week, I will call her. The screening in June was scheduled based off of recommendations from her last scan per Eri. I am just unsure if Dr. Osman is wanting her to get this Diagnostic done now and then the normal screening done in June. cpl

## 2024-01-12 DIAGNOSIS — Z85.3 HISTORY OF BREAST CANCER: Primary | ICD-10-CM

## 2024-01-17 ENCOUNTER — OFFICE VISIT (OUTPATIENT)
Dept: HEMATOLOGY/ONCOLOGY | Facility: CLINIC | Age: 69
End: 2024-01-17
Payer: MEDICARE

## 2024-01-17 VITALS
SYSTOLIC BLOOD PRESSURE: 148 MMHG | BODY MASS INDEX: 37.45 KG/M2 | WEIGHT: 211.38 LBS | DIASTOLIC BLOOD PRESSURE: 73 MMHG | OXYGEN SATURATION: 99 % | HEIGHT: 63 IN | HEART RATE: 62 BPM | TEMPERATURE: 97 F | RESPIRATION RATE: 18 BRPM

## 2024-01-17 DIAGNOSIS — C50.911 MALIGNANT NEOPLASM OF RIGHT BREAST IN FEMALE, ESTROGEN RECEPTOR POSITIVE, UNSPECIFIED SITE OF BREAST: Primary | ICD-10-CM

## 2024-01-17 DIAGNOSIS — D50.9 MICROCYTIC ANEMIA: ICD-10-CM

## 2024-01-17 DIAGNOSIS — Z79.811 LONG TERM CURRENT USE OF AROMATASE INHIBITOR: ICD-10-CM

## 2024-01-17 DIAGNOSIS — D56.9 THALASSEMIA, UNSPECIFIED TYPE: ICD-10-CM

## 2024-01-17 DIAGNOSIS — Z17.0 MALIGNANT NEOPLASM OF RIGHT BREAST IN FEMALE, ESTROGEN RECEPTOR POSITIVE, UNSPECIFIED SITE OF BREAST: Primary | ICD-10-CM

## 2024-01-17 PROCEDURE — 3288F FALL RISK ASSESSMENT DOCD: CPT | Mod: CPTII,S$GLB,,

## 2024-01-17 PROCEDURE — 1160F RVW MEDS BY RX/DR IN RCRD: CPT | Mod: CPTII,S$GLB,,

## 2024-01-17 PROCEDURE — 1101F PT FALLS ASSESS-DOCD LE1/YR: CPT | Mod: CPTII,S$GLB,,

## 2024-01-17 PROCEDURE — 3077F SYST BP >= 140 MM HG: CPT | Mod: CPTII,S$GLB,,

## 2024-01-17 PROCEDURE — 99999 PR PBB SHADOW E&M-EST. PATIENT-LVL IV: CPT | Mod: PBBFAC,,,

## 2024-01-17 PROCEDURE — 1159F MED LIST DOCD IN RCRD: CPT | Mod: CPTII,S$GLB,,

## 2024-01-17 PROCEDURE — 3078F DIAST BP <80 MM HG: CPT | Mod: CPTII,S$GLB,,

## 2024-01-17 PROCEDURE — 1126F AMNT PAIN NOTED NONE PRSNT: CPT | Mod: CPTII,S$GLB,,

## 2024-01-17 PROCEDURE — 99215 OFFICE O/P EST HI 40 MIN: CPT | Mod: S$GLB,,,

## 2024-01-17 PROCEDURE — 3008F BODY MASS INDEX DOCD: CPT | Mod: CPTII,S$GLB,,

## 2024-01-17 RX ORDER — ANASTROZOLE 1 MG/1
1 TABLET ORAL DAILY
Qty: 30 TABLET | Refills: 5 | Status: SHIPPED | OUTPATIENT
Start: 2024-01-17 | End: 2025-01-16

## 2024-01-17 NOTE — PROGRESS NOTES
HEMATOLOGY / ONCOLOGY   CLINIC NOTE     ONCOLOGICAL HISTORY:     Diagnosis:  - Stage IA ([id number]), INVASIVE MUCINOUS CARCINOMA, GRADE 1 (0.8 CM), ER+99.5% OK+97.9% ; HER2 1+, DCIS, grade1    Treatment History:  - ultrasound guided lumpectomy on 6/8/2022  - adjuvant XRT followed by AI    Current Treatment:   Anastrozole 1mg daily  Started 1/28/2023    Subjective:       Chief Complaint: Breast Cancer (No complaints.)          SERENA Stephen  68 y.o.  female with past medical history significant for      Oncologic History Stage IA ([id number]), NVASIVE MUCINOUS CARCINOMA, GRADE 1 (0.8 CM), ER+99.5% OK+97.9% HER2-  DCIS, grade1    Oncologic Treatment ultrasound guided lumpectomy on 6/8/2022  treated with adjuvant XRT followed by AI       Pathology RIGHT BREAST LUMPECTOMY:     INVASIVE MUCINOUS CARCINOMA, LOYOLA-PHILLIPS GRADE 1 (0.8 CM).     CARCINOMA IS 1 MM FROM THE SUPERIOR MARGIN (NEW SUPERIOR MARGIN CLEAR, SEE SPECIMEN #3).     MINIMAL DUCTAL CARCINOMA IN SITU, LOW GRADE.           TUMOR        Histologic Type: Mucinous carcinoma        Histologic Grade (Posen Histologic Score): Posen Score        Glandular (Acinar) / Tubular Differentiation: Score 1 (greater than 75% of tumor area forming glandular / tubular structures)        Nuclear Pleomorphism: Score 1 (Nuclei small with little increase in size in comparison with normal breast epithelial cells, regular outlines, uniform nuclear chromatin, little variation in size)        Mitotic Rate: Score 1        Overall Grade: Grade 1 (scores of 3, 4 or 5)        Tumor Size: Greatest dimension of largest invasive focus greater than 1 mm (specify exact measurement in Millimeters (mm)): 8 mm        Tumor Focality: Single focus of invasive carcinoma        Ductal Carcinoma In Situ (DCIS): Present        Ductal Carcinoma In Situ (DCIS): Negative for extensive intraductal component (EIC)        Nuclear Grade: Grade I (low)        Lymphovascular Invasion:  Not identified        Dermal Lymphovascular Invasion: No skin present        Treatment Effect in the Breast: No known presurgical therapy     MARGINS        Margin Status for Invasive Carcinoma: All margins negative for invasive carcinoma        Distance from Invasive Carcinoma to Closest Margin: Greater than: 10 mm        Closest Margin(s) to Invasive Carcinoma: Superior        Margin Status for DCIS: All margins negative for DCIS        Distance from DCIS to Closest Margin: Greater than: 10 mm        Closest Margin(s) to DCIS: Superior     REGIONAL LYMPH NODES        Regional Lymph Node Status: Regional lymph nodes present             All regional lymph nodes negative for tumor        Total Number of Lymph Nodes Examined (sentinel and non-sentinel): Exact number : 10        Number of [place] Nodes Examined: Exact number : 4       Interval History:   She returns to the clinic today for a four month follow-up regarding her history of right breast cancer. She continues to take her anastrozole daily, with minimal side effects. She does report hot flashes have improved and are tolerable.  She continues to have chronic fatigue that is stable and unchanged.  Bilateral diagnostic mammo was completed 5/15/2023 which showed no evidence of malignancy. An annual follow-up was recommended.  Mammogram currently scheduled for 06/2024. She has a follow up with  after mammogram. She denies any mood swings, joint pain, headache, swelling, breast pain, and unintentional weight loss.    Past Medical History:   Diagnosis Date    Arthritis     Breast cancer     Disorder of thyroid, unspecified     DM (diabetes mellitus)     Generalized anxiety disorder     HTN (hypertension)     Mixed hyperlipidemia     PONV (postoperative nausea and vomiting)     Pulmonary nodules     Thalassemia minor       Past Surgical History:   Procedure Laterality Date    CHOLECYSTECTOMY      HYSTERECTOMY       Social History     Socioeconomic History     Marital status:    Tobacco Use    Smoking status: Never    Smokeless tobacco: Never   Substance and Sexual Activity    Alcohol use: Never    Drug use: Never      Family History   Problem Relation Age of Onset    Dementia Mother     Cancer Father     Thalassemia Sister     Diabetes Mellitus Brother     Asthma Brother       Review of patient's allergies indicates:   Allergen Reactions    Adhesive tape-silicones       Review of Systems   Constitutional:  Positive for fatigue (chronic). Negative for activity change, chills and fever.   HENT: Negative.     Eyes: Negative.    Respiratory:  Negative for cough and shortness of breath.    Cardiovascular:  Negative for chest pain and leg swelling.   Gastrointestinal:  Negative for constipation, diarrhea, nausea and vomiting.   Endocrine: Negative.    Genitourinary: Negative.    Musculoskeletal:  Negative for arthralgias and myalgias.   Integumentary:  Negative.   Allergic/Immunologic: Negative.    Neurological:  Negative for light-headedness, numbness and headaches.   Hematological: Negative.    Psychiatric/Behavioral: Negative.           Objective:        Vitals:    01/17/24 0816   BP: (!) 148/73   Pulse: 62   Resp: 18   Temp: 97.4 °F (36.3 °C)            Physical Exam  Constitutional:       General: She is not in acute distress.     Appearance: She is well-developed. She is not ill-appearing.   HENT:      Head: Normocephalic and atraumatic.      Mouth/Throat:      Mouth: Mucous membranes are moist.   Eyes:      Extraocular Movements: Extraocular movements intact.      Conjunctiva/sclera: Conjunctivae normal.   Cardiovascular:      Rate and Rhythm: Normal rate and regular rhythm.      Heart sounds: Normal heart sounds.   Pulmonary:      Effort: Pulmonary effort is normal. No respiratory distress.      Breath sounds: Normal breath sounds. No wheezing.   Chest:      Comments: Well healed right lumpectomy. She does have notable scar tissue to lumpectomy site, with  mild tenderness to right axillary incision, that is unchanged. No abnormal masses, rash, discharge, or nipple inversion bilaterally.   Abdominal:      General: Bowel sounds are normal. There is no distension.      Palpations: Abdomen is soft.      Tenderness: There is no abdominal tenderness.   Musculoskeletal:         General: Normal range of motion.      Cervical back: Normal range of motion and neck supple.   Lymphadenopathy:      Cervical: No cervical adenopathy.      Upper Body:      Right upper body: No supraclavicular or axillary adenopathy.      Left upper body: No supraclavicular or axillary adenopathy.   Skin:     General: Skin is warm.   Neurological:      General: No focal deficit present.      Mental Status: She is alert and oriented to person, place, and time. Mental status is at baseline.      Cranial Nerves: No cranial nerve deficit.   Psychiatric:         Mood and Affect: Mood normal.           LABS / IMAGING      - 12/12/2022 mammogram:  There is an ovoid shaped mass with mostly smooth but partially obscured margins located in the right upper quadrant corresponding to a seroma associated with the surgical scar.  The margin has a somewhat irregularly thickened margin measuring 1-3 mm in thickness and there are focal areas of thin septations associated with the internal margin of this fluid collection.  It measures approximately 4.6 x 2.8 x 6.7 cm.  This shows a typical appearance of a postsurgical/post radiation seroma.  Recommend a repeat mammogram in 6 months    -5/15/2023 mammogram: there are scattered areas of fibroglandular density (density category B), there are postoperative changes of the right breast, there are no suspicious findings in either breast, benign findings- no evidence of malignancy, routine screening mammogram in 12 months     PATHOLOGY   - 06/19/2022: Mucinous carcinoma; Grade 1; 8 mm; LN 0/10, ER 99%, SD 97%, HER2 1+,     ONCOTYPE Dx:  07/18/2022:  Proprietary recurrent score  "of 9, distance recurrence at 9 year bella at 3% with antihormone treatment, and benefit from chemotherapy less than 1%.    Assessment:     ECOG Performance status: 0      Stage IA ([id number]), INVASIVE MUCINOUS CARCINOMA, GRADE 1 (0.8 CM), ER+99.5% UT+97.9% HER2- DCIS, grade1  ultrasound guided lumpectomy on 6/8/2022  s/p adjuvant XRT - 08/2022.    Aromasin 1mg daily started 1/28/2023  Tolerating well, without any significant side effects. She does report mild, occasional hot flashes. Continue therapy at this time.  Mammogram   Bilateral diagnostic mammogram completed 05/15/2023 showed no evidence of malignancy.  Routine screening mammogram recommended in 12 months.  Mammogram scheduled for 6/2024  Bone density  DEXA completed 1/27/2023 showed normal bone density to both spine and hip.   FU DEXA due 1/28/2025.  Currently only taking vitamin-D supplement due to history of elevated calcium.  Anemia   Hx of thalassemia, managed through Dr. Chun. She does report that she "takes oral iron when needed". Iron studies are stable.   Vaginal dryness  Replens daily as well as lubricants as needed.        Plan:   Labs and exam stable   Continue Anastrozole daily.  Continue f/u with breast surgery as scheduled 6/2024.  Annual mammo scheduled for 6/2024.  RTC in 4 months with NP for FU/lab/CBE    The patient was seen, interviewed and examined. Pertinent lab and radiology studies were reviewed. Pt instructed to call should develop concerning signs/symptoms or have further questions.       Portions of the record may have been created with voice recognition software. Occasional wrong-word or sound-a-like substitutions may have occurred due to the inherent limitations of voice recognition software. Read the chart carefully and recognize, using context, where substitutions have occurred.    Fani Gates, JANETHP-C  Oncology/Hematology  [facility]          "

## 2024-06-10 ENCOUNTER — OFFICE VISIT (OUTPATIENT)
Dept: HEMATOLOGY/ONCOLOGY | Facility: CLINIC | Age: 69
End: 2024-06-10
Payer: MEDICARE

## 2024-06-10 ENCOUNTER — LAB VISIT (OUTPATIENT)
Dept: LAB | Facility: HOSPITAL | Age: 69
End: 2024-06-10
Payer: MEDICARE

## 2024-06-10 VITALS
HEIGHT: 63 IN | SYSTOLIC BLOOD PRESSURE: 116 MMHG | OXYGEN SATURATION: 100 % | WEIGHT: 203 LBS | BODY MASS INDEX: 35.97 KG/M2 | RESPIRATION RATE: 20 BRPM | DIASTOLIC BLOOD PRESSURE: 77 MMHG | HEART RATE: 66 BPM

## 2024-06-10 DIAGNOSIS — Z17.0 ER+ (ESTROGEN RECEPTOR POSITIVE STATUS): ICD-10-CM

## 2024-06-10 DIAGNOSIS — C50.911 MALIGNANT NEOPLASM OF RIGHT BREAST IN FEMALE, ESTROGEN RECEPTOR POSITIVE, UNSPECIFIED SITE OF BREAST: ICD-10-CM

## 2024-06-10 DIAGNOSIS — D05.11 DUCTAL CARCINOMA IN SITU (DCIS) OF RIGHT BREAST: ICD-10-CM

## 2024-06-10 DIAGNOSIS — C50.911 MALIGNANT NEOPLASM OF RIGHT BREAST IN FEMALE, ESTROGEN RECEPTOR POSITIVE, UNSPECIFIED SITE OF BREAST: Primary | ICD-10-CM

## 2024-06-10 DIAGNOSIS — C50.911 MUCINOUS CARCINOMA OF RIGHT BREAST: ICD-10-CM

## 2024-06-10 DIAGNOSIS — Z17.0 MALIGNANT NEOPLASM OF RIGHT BREAST IN FEMALE, ESTROGEN RECEPTOR POSITIVE, UNSPECIFIED SITE OF BREAST: ICD-10-CM

## 2024-06-10 DIAGNOSIS — R23.2 HOT FLASHES: ICD-10-CM

## 2024-06-10 DIAGNOSIS — Z17.0 MALIGNANT NEOPLASM OF RIGHT BREAST IN FEMALE, ESTROGEN RECEPTOR POSITIVE, UNSPECIFIED SITE OF BREAST: Primary | ICD-10-CM

## 2024-06-10 DIAGNOSIS — D56.9 THALASSEMIA, UNSPECIFIED TYPE: ICD-10-CM

## 2024-06-10 DIAGNOSIS — Z79.811 LONG TERM CURRENT USE OF AROMATASE INHIBITOR: ICD-10-CM

## 2024-06-10 DIAGNOSIS — R53.83 FATIGUE, UNSPECIFIED TYPE: ICD-10-CM

## 2024-06-10 DIAGNOSIS — Z17.0 MALIGNANT NEOPLASM OF UPPER-OUTER QUADRANT OF BREAST IN FEMALE, ESTROGEN RECEPTOR POSITIVE, UNSPECIFIED LATERALITY: ICD-10-CM

## 2024-06-10 DIAGNOSIS — D50.9 MICROCYTIC ANEMIA: ICD-10-CM

## 2024-06-10 DIAGNOSIS — C50.419 MALIGNANT NEOPLASM OF UPPER-OUTER QUADRANT OF BREAST IN FEMALE, ESTROGEN RECEPTOR POSITIVE, UNSPECIFIED LATERALITY: ICD-10-CM

## 2024-06-10 LAB
ALBUMIN SERPL-MCNC: 3.6 G/DL (ref 3.4–4.8)
ALBUMIN/GLOB SERPL: 1 RATIO (ref 1.1–2)
ALP SERPL-CCNC: 58 UNIT/L (ref 40–150)
ALT SERPL-CCNC: 13 UNIT/L (ref 0–55)
ANION GAP SERPL CALC-SCNC: 7 MEQ/L
AST SERPL-CCNC: 18 UNIT/L (ref 5–34)
BASOPHILS # BLD AUTO: 0.04 X10(3)/MCL
BASOPHILS NFR BLD AUTO: 0.4 %
BILIRUB SERPL-MCNC: 0.4 MG/DL
BUN SERPL-MCNC: 14 MG/DL (ref 9.8–20.1)
CALCIUM SERPL-MCNC: 10 MG/DL (ref 8.4–10.2)
CHLORIDE SERPL-SCNC: 107 MMOL/L (ref 98–107)
CO2 SERPL-SCNC: 27 MMOL/L (ref 23–31)
CREAT SERPL-MCNC: 0.78 MG/DL (ref 0.55–1.02)
CREAT/UREA NIT SERPL: 18
EOSINOPHIL # BLD AUTO: 0.12 X10(3)/MCL (ref 0–0.9)
EOSINOPHIL NFR BLD AUTO: 1.2 %
ERYTHROCYTE [DISTWIDTH] IN BLOOD BY AUTOMATED COUNT: 16.2 % (ref 11.5–17)
GFR SERPLBLD CREATININE-BSD FMLA CKD-EPI: >60 ML/MIN/1.73/M2
GLOBULIN SER-MCNC: 3.7 GM/DL (ref 2.4–3.5)
GLUCOSE SERPL-MCNC: 153 MG/DL (ref 82–115)
HCT VFR BLD AUTO: 33.4 % (ref 37–47)
HGB BLD-MCNC: 10 G/DL (ref 12–16)
IMM GRANULOCYTES # BLD AUTO: 0.05 X10(3)/MCL (ref 0–0.04)
IMM GRANULOCYTES NFR BLD AUTO: 0.5 %
LYMPHOCYTES # BLD AUTO: 2.44 X10(3)/MCL (ref 0.6–4.6)
LYMPHOCYTES NFR BLD AUTO: 25.2 %
MAGNESIUM SERPL-MCNC: 1.7 MG/DL (ref 1.6–2.6)
MCH RBC QN AUTO: 19.3 PG (ref 27–31)
MCHC RBC AUTO-ENTMCNC: 29.9 G/DL (ref 33–36)
MCV RBC AUTO: 64.6 FL (ref 80–94)
MICROCYTES BLD QL SMEAR: SLIGHT
MONOCYTES # BLD AUTO: 0.78 X10(3)/MCL (ref 0.1–1.3)
MONOCYTES NFR BLD AUTO: 8 %
NEUTROPHILS # BLD AUTO: 6.26 X10(3)/MCL (ref 2.1–9.2)
NEUTROPHILS NFR BLD AUTO: 64.7 %
PHOSPHATE SERPL-MCNC: 3.4 MG/DL (ref 2.3–4.7)
PLATELET # BLD AUTO: 253 X10(3)/MCL (ref 130–400)
PLATELET # BLD EST: ADEQUATE 10*3/UL
PMV BLD AUTO: 9.3 FL (ref 7.4–10.4)
POIKILOCYTOSIS BLD QL SMEAR: SLIGHT
POTASSIUM SERPL-SCNC: 4.3 MMOL/L (ref 3.5–5.1)
PROT SERPL-MCNC: 7.3 GM/DL (ref 5.8–7.6)
RBC # BLD AUTO: 5.17 X10(6)/MCL (ref 4.2–5.4)
RBC MORPH BLD: ABNORMAL
SODIUM SERPL-SCNC: 141 MMOL/L (ref 136–145)
WBC # SPEC AUTO: 9.69 X10(3)/MCL (ref 4.5–11.5)

## 2024-06-10 PROCEDURE — 99999 PR PBB SHADOW E&M-EST. PATIENT-LVL IV: CPT | Mod: PBBFAC,,,

## 2024-06-10 PROCEDURE — 99215 OFFICE O/P EST HI 40 MIN: CPT | Mod: S$GLB,,,

## 2024-06-10 PROCEDURE — 3074F SYST BP LT 130 MM HG: CPT | Mod: CPTII,S$GLB,,

## 2024-06-10 PROCEDURE — 1101F PT FALLS ASSESS-DOCD LE1/YR: CPT | Mod: CPTII,S$GLB,,

## 2024-06-10 PROCEDURE — 1160F RVW MEDS BY RX/DR IN RCRD: CPT | Mod: CPTII,S$GLB,,

## 2024-06-10 PROCEDURE — 1159F MED LIST DOCD IN RCRD: CPT | Mod: CPTII,S$GLB,,

## 2024-06-10 PROCEDURE — 1126F AMNT PAIN NOTED NONE PRSNT: CPT | Mod: CPTII,S$GLB,,

## 2024-06-10 PROCEDURE — 4010F ACE/ARB THERAPY RXD/TAKEN: CPT | Mod: CPTII,S$GLB,,

## 2024-06-10 PROCEDURE — 3008F BODY MASS INDEX DOCD: CPT | Mod: CPTII,S$GLB,,

## 2024-06-10 PROCEDURE — 85025 COMPLETE CBC W/AUTO DIFF WBC: CPT

## 2024-06-10 PROCEDURE — 80053 COMPREHEN METABOLIC PANEL: CPT

## 2024-06-10 PROCEDURE — 3288F FALL RISK ASSESSMENT DOCD: CPT | Mod: CPTII,S$GLB,,

## 2024-06-10 PROCEDURE — 83735 ASSAY OF MAGNESIUM: CPT

## 2024-06-10 PROCEDURE — 3078F DIAST BP <80 MM HG: CPT | Mod: CPTII,S$GLB,,

## 2024-06-10 PROCEDURE — 36415 COLL VENOUS BLD VENIPUNCTURE: CPT

## 2024-06-10 PROCEDURE — 84100 ASSAY OF PHOSPHORUS: CPT

## 2024-06-10 RX ORDER — TIRZEPATIDE 5 MG/.5ML
5 INJECTION, SOLUTION SUBCUTANEOUS WEEKLY
COMMUNITY
Start: 2024-05-14

## 2024-06-10 NOTE — PROGRESS NOTES
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  female with past medical history significant for      Oncologic History Stage IA ([id number]), NVASIVE MUCINOUS CARCINOMA, GRADE 1 (0.8 CM), ER+99.5% MI+97.9% HER2-  DCIS, grade1    Oncologic Treatment ultrasound guided lumpectomy on 6/8/2022  treated with adjuvant XRT followed by AI       Pathology RIGHT BREAST LUMPECTOMY:     INVASIVE MUCINOUS CARCINOMA, LOYOLA-PHILLIPS GRADE 1 (0.8 CM).     CARCINOMA IS 1 MM FROM THE SUPERIOR MARGIN (NEW SUPERIOR MARGIN CLEAR, SEE SPECIMEN #3).     MINIMAL DUCTAL CARCINOMA IN SITU, LOW GRADE.           TUMOR        Histologic Type: Mucinous carcinoma        Histologic Grade (Daphne Histologic Score): Daphne Score        Glandular (Acinar) / Tubular Differentiation: Score 1 (greater than 75% of tumor area forming glandular / tubular structures)        Nuclear Pleomorphism: Score 1 (Nuclei small with little increase in size in comparison with normal breast epithelial cells, regular outlines, uniform nuclear chromatin, little variation in size)        Mitotic Rate: Score 1        Overall Grade: Grade 1 (scores of 3, 4 or 5)        Tumor Size: Greatest dimension of largest invasive focus greater than 1 mm (specify exact measurement in Millimeters (mm)): 8 mm        Tumor Focality: Single focus of invasive carcinoma        Ductal Carcinoma In Situ (DCIS): Present        Ductal Carcinoma In Situ (DCIS): Negative for extensive intraductal component (EIC)        Nuclear Grade: Grade I  (low)        Lymphovascular Invasion: Not identified        Dermal Lymphovascular Invasion: No skin present        Treatment Effect in the Breast: No known presurgical therapy     MARGINS        Margin Status for Invasive Carcinoma: All margins negative for invasive carcinoma        Distance from Invasive Carcinoma to Closest Margin: Greater than: 10 mm        Closest Margin(s) to Invasive Carcinoma: Superior        Margin Status for DCIS: All margins negative for DCIS        Distance from DCIS to Closest Margin: Greater than: 10 mm        Closest Margin(s) to DCIS: Superior     REGIONAL LYMPH NODES        Regional Lymph Node Status: Regional lymph nodes present             All regional lymph nodes negative for tumor        Total Number of Lymph Nodes Examined (sentinel and non-sentinel): Exact number : 10        Number of Roma Nodes Examined: Exact number : 4       Interval History:   She returns to the clinic today for a four month follow-up regarding her history of right breast cancer. She continues to take her anastrozole daily. She does note worsened fatigue while on her med,but declines switching to new med at this time. She does report hot flashes have improved and are tolerable. Bilateral diagnostic mammo was completed 6/6/2024 which showed no evidence of malignancy. An annual follow-up was recommended. She denies any mood swings, joint pain, headache, swelling, breast pain, and unintentional weight loss.    Past Medical History:   Diagnosis Date    Arthritis     Breast cancer     Disorder of thyroid, unspecified     DM (diabetes mellitus)     Generalized anxiety disorder     HTN (hypertension)     Mixed hyperlipidemia     PONV (postoperative nausea and vomiting)     Pulmonary nodules     Thalassemia minor       Past Surgical History:   Procedure Laterality Date    CHOLECYSTECTOMY      HYSTERECTOMY       Social History     Socioeconomic History    Marital status:    Tobacco Use    Smoking status:  Never    Smokeless tobacco: Never   Substance and Sexual Activity    Alcohol use: Never    Drug use: Never      Family History   Problem Relation Name Age of Onset    Dementia Mother      Cancer Father      Thalassemia Sister      Diabetes Mellitus Brother      Asthma Brother        Review of patient's allergies indicates:   Allergen Reactions    Adhesive tape-silicones       Review of Systems   Constitutional:  Positive for fatigue (chronic). Negative for activity change, chills and fever.   HENT: Negative.     Eyes: Negative.    Respiratory:  Negative for cough and shortness of breath.    Cardiovascular:  Negative for chest pain and leg swelling.   Gastrointestinal:  Negative for constipation, diarrhea, nausea and vomiting.   Endocrine: Negative.    Genitourinary: Negative.    Musculoskeletal:  Negative for arthralgias and myalgias.   Integumentary:  Negative.   Allergic/Immunologic: Negative.    Neurological:  Negative for light-headedness, numbness and headaches.   Hematological: Negative.    Psychiatric/Behavioral: Negative.           Objective:        Vitals:    06/10/24 0946   BP: 116/77   Pulse: 66   Resp: 20              Physical Exam  Constitutional:       General: She is not in acute distress.     Appearance: She is well-developed. She is not ill-appearing.   HENT:      Head: Normocephalic and atraumatic.      Mouth/Throat:      Mouth: Mucous membranes are moist.   Eyes:      Extraocular Movements: Extraocular movements intact.      Conjunctiva/sclera: Conjunctivae normal.   Cardiovascular:      Rate and Rhythm: Normal rate and regular rhythm.      Heart sounds: Normal heart sounds.   Pulmonary:      Effort: Pulmonary effort is normal. No respiratory distress.      Breath sounds: Normal breath sounds. No wheezing.   Chest:      Comments: Well healed right lumpectomy. She does have notable scar tissue to lumpectomy site, with mild tenderness to right axillary incision, that is unchanged. No abnormal  masses, rash, discharge, or nipple inversion bilaterally.   Abdominal:      General: Bowel sounds are normal. There is no distension.      Palpations: Abdomen is soft.      Tenderness: There is no abdominal tenderness.   Musculoskeletal:         General: Normal range of motion.      Cervical back: Normal range of motion and neck supple.   Lymphadenopathy:      Cervical: No cervical adenopathy.      Upper Body:      Right upper body: No supraclavicular or axillary adenopathy.      Left upper body: No supraclavicular or axillary adenopathy.   Skin:     General: Skin is warm.   Neurological:      General: No focal deficit present.      Mental Status: She is alert and oriented to person, place, and time. Mental status is at baseline.      Cranial Nerves: No cranial nerve deficit.   Psychiatric:         Mood and Affect: Mood normal.           LABS / IMAGING      - 12/12/2022 mammogram:  There is an ovoid shaped mass with mostly smooth but partially obscured margins located in the right upper quadrant corresponding to a seroma associated with the surgical scar.  The margin has a somewhat irregularly thickened margin measuring 1-3 mm in thickness and there are focal areas of thin septations associated with the internal margin of this fluid collection.  It measures approximately 4.6 x 2.8 x 6.7 cm.  This shows a typical appearance of a postsurgical/post radiation seroma.  Recommend a repeat mammogram in 6 months    -5/15/2023 mammogram: there are scattered areas of fibroglandular density (density category B), there are postoperative changes of the right breast, there are no suspicious findings in either breast, benign findings- no evidence of malignancy, routine screening mammogram in 12 months     PATHOLOGY   - 06/19/2022: Mucinous carcinoma; Grade 1; 8 mm; LN 0/10, ER 99%, ID 97%, HER2 1+,     ONCOTYPE Dx:  07/18/2022:  Proprietary recurrent score of 9, distance recurrence at 9 year bella at 3% with antihormone treatment,  "and benefit from chemotherapy less than 1%.    Assessment:     ECOG Performance status: 0      Stage IA ([id number]), INVASIVE MUCINOUS CARCINOMA, GRADE 1 (0.8 CM), ER+99.5% TX+97.9% HER2- DCIS, grade1  ultrasound guided lumpectomy on 6/8/2022  s/p adjuvant XRT - 08/2022.    Aromasin 1mg daily started 1/28/2023  Tolerating well, without any significant side effects. She does report mild, occasional hot flashes. Continue therapy at this time.  Mammogram   Bilateral diagnostic mammogram completed 05/15/2023 showed no evidence of malignancy.  Routine screening mammogram recommended in 12 months.  Mammogram scheduled for 6/2024  Bone density  DEXA completed 1/27/2023 showed normal bone density to both spine and hip.   FU DEXA due 1/28/2025.  Currently only taking vitamin-D supplement due to history of elevated calcium.  Anemia   Hx of thalassemia, managed through Dr. Chun. She does report that she "takes oral iron when needed". Iron studies are stable.   Vaginal dryness  Replens daily as well as lubricants as needed.        Plan:   Labs and exam stable   Continue Anastrozole daily.  Continue f/u with breast surgery as scheduled 6/2024.  Annual mammo due for 6/2025.  DEXA due 1/28/2024, will order at next OV.   RTC in 4 months with NP for FU/lab/CBE    The patient was seen, interviewed and examined. Pertinent lab and radiology studies were reviewed. Pt instructed to call should develop concerning signs/symptoms or have further questions.       Portions of the record may have been created with voice recognition software. Occasional wrong-word or sound-a-like substitutions may have occurred due to the inherent limitations of voice recognition software. Read the chart carefully and recognize, using context, where substitutions have occurred.    Kitty Lyman, JANETHP-C  Oncology/Hematology   [facility]             "

## 2024-06-18 ENCOUNTER — OFFICE VISIT (OUTPATIENT)
Dept: SURGICAL ONCOLOGY | Facility: CLINIC | Age: 69
End: 2024-06-18
Payer: MEDICARE

## 2024-06-18 VITALS
SYSTOLIC BLOOD PRESSURE: 98 MMHG | DIASTOLIC BLOOD PRESSURE: 66 MMHG | BODY MASS INDEX: 34.62 KG/M2 | HEART RATE: 64 BPM | HEIGHT: 64 IN | WEIGHT: 202.81 LBS

## 2024-06-18 DIAGNOSIS — Z85.3 HISTORY OF BREAST CANCER: Primary | ICD-10-CM

## 2024-06-18 PROCEDURE — 3078F DIAST BP <80 MM HG: CPT | Mod: CPTII,S$GLB,, | Performed by: SURGERY

## 2024-06-18 PROCEDURE — 99214 OFFICE O/P EST MOD 30 MIN: CPT | Mod: S$GLB,,, | Performed by: SURGERY

## 2024-06-18 PROCEDURE — 1159F MED LIST DOCD IN RCRD: CPT | Mod: CPTII,S$GLB,, | Performed by: SURGERY

## 2024-06-18 PROCEDURE — 4010F ACE/ARB THERAPY RXD/TAKEN: CPT | Mod: CPTII,S$GLB,, | Performed by: SURGERY

## 2024-06-18 PROCEDURE — 99999 PR PBB SHADOW E&M-EST. PATIENT-LVL III: CPT | Mod: PBBFAC,,, | Performed by: SURGERY

## 2024-06-18 PROCEDURE — 3074F SYST BP LT 130 MM HG: CPT | Mod: CPTII,S$GLB,, | Performed by: SURGERY

## 2024-06-18 PROCEDURE — 1101F PT FALLS ASSESS-DOCD LE1/YR: CPT | Mod: CPTII,S$GLB,, | Performed by: SURGERY

## 2024-06-18 PROCEDURE — 3008F BODY MASS INDEX DOCD: CPT | Mod: CPTII,S$GLB,, | Performed by: SURGERY

## 2024-06-18 PROCEDURE — 3288F FALL RISK ASSESSMENT DOCD: CPT | Mod: CPTII,S$GLB,, | Performed by: SURGERY

## 2024-06-18 NOTE — PROGRESS NOTES
Chief complaint:  Follow-up breast cancer     HPI:  Patient returns for follow-up of Stage IA ([id number]), INVASIVE MUCINOUS CARCINOMA, GRADE 1 (0.8 CM), ER+ ID+ ; HER2 1+, DCIS, grade1, status post lumpectomy June 8, 2022 followed by adjuvant radiation therapy.  She is set to begin antiestrogen therapy.She denies breast masses, skin changes, nipple inversion or discharge on self-breast exam.    Recent surveillance mammography was reviewed, I personally interpreted the images and reviewed the report.  I discussed with the patient in detail and questions were addressed.  There are no suspicious findings.    31 minutes was required for complete chart review, imaging review patient consultation and documentation        Past Medical and Surgical History  Allergies :   Adhesive tape-silicones    [facility]  Medical :   She has a past medical history of Arthritis, Breast cancer, Disorder of thyroid, unspecified, DM (diabetes mellitus), Generalized anxiety disorder, HTN (hypertension), Mixed hyperlipidemia, PONV (postoperative nausea and vomiting), Pulmonary nodules, and Thalassemia minor.    Surgical :   She has a past surgical history that includes Cholecystectomy and Hysterectomy.     Family History  Her family history includes Asthma in her brother; Cancer in her father; Dementia in her mother; Diabetes Mellitus in her brother; Thalassemia in her sister.    Social History  She reports that she has never smoked. She has never used smokeless tobacco. She reports that she does not drink alcohol and does not use drugs.     Review of Systems   Constitutional:  Negative for appetite change, chills, diaphoresis and fever.   HENT:  Negative for congestion, drooling, ear discharge, ear pain and hearing loss.    Eyes:  Negative for discharge.   Respiratory:  Negative for apnea, cough, choking, chest tightness, shortness of breath and stridor.    Cardiovascular:  Negative for chest pain, palpitations and leg swelling.   Endocrine:  Negative for cold intolerance and heat intolerance.   Genitourinary:  Negative for difficulty urinating, dyspareunia, dysuria and hematuria.   Musculoskeletal:  Negative for arthralgias, gait problem and joint swelling.   Skin:  Negative for color change and rash.   Neurological:  Negative for dizziness, tremors, seizures, syncope, facial asymmetry, speech difficulty, light-headedness, numbness and headaches.   Psychiatric/Behavioral:  Negative for agitation and confusion.         Objective   Physical Exam  Vitals and nursing note reviewed.   Constitutional:       General: She is not in acute distress.     Appearance: Normal appearance. She is normal weight. She is not ill-appearing, toxic-appearing or diaphoretic.   HENT:      Head: Normocephalic and atraumatic.      Right Ear: External ear normal.      Left Ear: External ear normal.      Nose: Nose normal.      Mouth/Throat:      Mouth: Mucous membranes are moist.      Pharynx: Oropharynx is clear.   Eyes:      General: No scleral icterus.     Extraocular Movements: Extraocular movements intact.      Conjunctiva/sclera: Conjunctivae normal.      Pupils: Pupils are equal, round, and reactive to light.   Cardiovascular:      Rate and Rhythm: Normal rate and regular rhythm.      Pulses: Normal pulses.      Heart sounds: Normal heart sounds. No murmur heard.     No friction rub. No gallop.   Pulmonary:      Effort: Pulmonary effort is normal. No respiratory distress.      Breath sounds: Normal breath sounds. No stridor. No wheezing or rhonchi.   Chest:      Chest wall: No tenderness.   Breasts:     Right: No swelling, bleeding, inverted nipple, mass, nipple discharge, skin change or tenderness.      Left: No swelling, bleeding, inverted nipple, mass, nipple discharge, skin change or tenderness.   Abdominal:      General: Abdomen is flat. There is no distension.      Palpations: Abdomen is soft. There is no mass.      Tenderness: There is no abdominal tenderness. There  "is no right CVA tenderness, left CVA tenderness, guarding or rebound.      Hernia: No hernia is present.   Musculoskeletal:         General: No swelling, tenderness, deformity or signs of injury. Normal range of motion.      Cervical back: Normal range of motion and neck supple. No rigidity or tenderness.      Right lower leg: No edema.      Left lower leg: No edema.   Lymphadenopathy:      Cervical: No cervical adenopathy.      Upper Body:      Right upper body: No supraclavicular or axillary adenopathy.      Left upper body: No supraclavicular or axillary adenopathy.   Skin:     General: Skin is warm.      Capillary Refill: Capillary refill takes less than 2 seconds.      Coloration: Skin is not jaundiced or pale.      Findings: No bruising, erythema, lesion or rash.   Neurological:      General: No focal deficit present.      Mental Status: She is alert and oriented to person, place, and time. Mental status is at baseline.      Cranial Nerves: No cranial nerve deficit.      Sensory: No sensory deficit.      Motor: No weakness.      Coordination: Coordination normal.      Gait: Gait normal.   Psychiatric:         Mood and Affect: Mood normal.         Behavior: Behavior normal.         Thought Content: Thought content normal.         Judgment: Judgment normal.       VITAL SIGNS: 24 HR MIN & MAX LAST    @FLOWSTAT(6:24::1)@           @FLOWSTAT(5:24::1)@  98/66     @FLOWSTAT(8:24::1)@  64     @FLOWSTAT(9:24::1)@       @FLOWSTAT(10:24::1)@         HT: 5' 3.5" (161.3 cm)  WT: 92 kg (202 lb 12.8 oz)  BMI: 35.4       Assessment & Plan     Stage I right breast cancer status post breast conserving therapy, lumpectomy June 2022    No evidence of disease    Continue surveillance with medical oncology          "

## 2024-09-03 DIAGNOSIS — C50.911 MALIGNANT NEOPLASM OF RIGHT BREAST IN FEMALE, ESTROGEN RECEPTOR POSITIVE, UNSPECIFIED SITE OF BREAST: ICD-10-CM

## 2024-09-03 DIAGNOSIS — Z17.0 MALIGNANT NEOPLASM OF RIGHT BREAST IN FEMALE, ESTROGEN RECEPTOR POSITIVE, UNSPECIFIED SITE OF BREAST: ICD-10-CM

## 2024-09-03 RX ORDER — ANASTROZOLE 1 MG/1
1 TABLET ORAL DAILY
Qty: 30 TABLET | Refills: 5 | Status: SHIPPED | OUTPATIENT
Start: 2024-09-03 | End: 2025-09-03

## 2024-09-24 DIAGNOSIS — C50.911 MALIGNANT NEOPLASM OF RIGHT BREAST IN FEMALE, ESTROGEN RECEPTOR POSITIVE, UNSPECIFIED SITE OF BREAST: ICD-10-CM

## 2024-09-24 DIAGNOSIS — Z17.0 MALIGNANT NEOPLASM OF RIGHT BREAST IN FEMALE, ESTROGEN RECEPTOR POSITIVE, UNSPECIFIED SITE OF BREAST: ICD-10-CM

## 2024-09-24 RX ORDER — ANASTROZOLE 1 MG/1
1 TABLET ORAL DAILY
Qty: 30 TABLET | Refills: 5 | Status: SHIPPED | OUTPATIENT
Start: 2024-09-24 | End: 2025-09-24

## 2024-10-09 ENCOUNTER — OFFICE VISIT (OUTPATIENT)
Dept: HEMATOLOGY/ONCOLOGY | Facility: CLINIC | Age: 69
End: 2024-10-09
Payer: MEDICARE

## 2024-10-09 ENCOUNTER — LAB VISIT (OUTPATIENT)
Dept: LAB | Facility: HOSPITAL | Age: 69
End: 2024-10-09
Payer: MEDICARE

## 2024-10-09 VITALS
HEART RATE: 64 BPM | RESPIRATION RATE: 16 BRPM | WEIGHT: 199 LBS | HEIGHT: 64 IN | DIASTOLIC BLOOD PRESSURE: 72 MMHG | SYSTOLIC BLOOD PRESSURE: 108 MMHG | TEMPERATURE: 97 F | BODY MASS INDEX: 33.97 KG/M2 | OXYGEN SATURATION: 99 %

## 2024-10-09 DIAGNOSIS — Z79.811 LONG TERM CURRENT USE OF AROMATASE INHIBITOR: Primary | ICD-10-CM

## 2024-10-09 DIAGNOSIS — Z79.811 LONG TERM CURRENT USE OF AROMATASE INHIBITOR: ICD-10-CM

## 2024-10-09 DIAGNOSIS — Z17.0 ER+ (ESTROGEN RECEPTOR POSITIVE STATUS): ICD-10-CM

## 2024-10-09 DIAGNOSIS — R23.2 HOT FLASHES: ICD-10-CM

## 2024-10-09 DIAGNOSIS — R53.83 FATIGUE, UNSPECIFIED TYPE: ICD-10-CM

## 2024-10-09 DIAGNOSIS — C50.911 MALIGNANT NEOPLASM OF RIGHT BREAST IN FEMALE, ESTROGEN RECEPTOR POSITIVE, UNSPECIFIED SITE OF BREAST: ICD-10-CM

## 2024-10-09 DIAGNOSIS — Z17.0 MALIGNANT NEOPLASM OF RIGHT BREAST IN FEMALE, ESTROGEN RECEPTOR POSITIVE, UNSPECIFIED SITE OF BREAST: ICD-10-CM

## 2024-10-09 DIAGNOSIS — D56.9 THALASSEMIA, UNSPECIFIED TYPE: ICD-10-CM

## 2024-10-09 LAB
25(OH)D3+25(OH)D2 SERPL-MCNC: 54 NG/ML (ref 30–80)
ALBUMIN SERPL-MCNC: 3.8 G/DL (ref 3.4–4.8)
ALBUMIN/GLOB SERPL: 1.2 RATIO (ref 1.1–2)
ALP SERPL-CCNC: 56 UNIT/L (ref 40–150)
ALT SERPL-CCNC: 13 UNIT/L (ref 0–55)
ANION GAP SERPL CALC-SCNC: 9 MEQ/L
AST SERPL-CCNC: 13 UNIT/L (ref 5–34)
BASOPHILS # BLD AUTO: 0.03 X10(3)/MCL
BASOPHILS NFR BLD AUTO: 0.3 %
BILIRUB SERPL-MCNC: 0.4 MG/DL
BUN SERPL-MCNC: 24 MG/DL (ref 9.8–20.1)
CALCIUM SERPL-MCNC: 9.8 MG/DL (ref 8.4–10.2)
CHLORIDE SERPL-SCNC: 104 MMOL/L (ref 98–107)
CO2 SERPL-SCNC: 26 MMOL/L (ref 23–31)
CREAT SERPL-MCNC: 0.89 MG/DL (ref 0.55–1.02)
CREAT/UREA NIT SERPL: 27
EOSINOPHIL # BLD AUTO: 0.01 X10(3)/MCL (ref 0–0.9)
EOSINOPHIL NFR BLD AUTO: 0.1 %
ERYTHROCYTE [DISTWIDTH] IN BLOOD BY AUTOMATED COUNT: 16.2 % (ref 11.5–17)
FERRITIN SERPL-MCNC: 649.22 NG/ML (ref 4.63–204)
FOLATE SERPL-MCNC: 7.4 NG/ML (ref 7–31.4)
GFR SERPLBLD CREATININE-BSD FMLA CKD-EPI: >60 ML/MIN/1.73/M2
GLOBULIN SER-MCNC: 3.3 GM/DL (ref 2.4–3.5)
GLUCOSE SERPL-MCNC: 235 MG/DL (ref 82–115)
HCT VFR BLD AUTO: 31 % (ref 37–47)
HGB BLD-MCNC: 9.4 G/DL (ref 12–16)
IMM GRANULOCYTES # BLD AUTO: 0.07 X10(3)/MCL (ref 0–0.04)
IMM GRANULOCYTES NFR BLD AUTO: 0.7 %
IRON SATN MFR SERPL: 38 % (ref 20–50)
IRON SERPL-MCNC: 109 UG/DL (ref 50–170)
LYMPHOCYTES # BLD AUTO: 1.6 X10(3)/MCL (ref 0.6–4.6)
LYMPHOCYTES NFR BLD AUTO: 16.6 %
MAGNESIUM SERPL-MCNC: 1.6 MG/DL (ref 1.6–2.6)
MCH RBC QN AUTO: 19.7 PG (ref 27–31)
MCHC RBC AUTO-ENTMCNC: 30.3 G/DL (ref 33–36)
MCV RBC AUTO: 64.9 FL (ref 80–94)
MONOCYTES # BLD AUTO: 0.63 X10(3)/MCL (ref 0.1–1.3)
MONOCYTES NFR BLD AUTO: 6.5 %
NEUTROPHILS # BLD AUTO: 7.32 X10(3)/MCL (ref 2.1–9.2)
NEUTROPHILS NFR BLD AUTO: 75.8 %
PLATELET # BLD AUTO: 274 X10(3)/MCL (ref 130–400)
PMV BLD AUTO: 9.4 FL (ref 7.4–10.4)
POTASSIUM SERPL-SCNC: 4.3 MMOL/L (ref 3.5–5.1)
PROT SERPL-MCNC: 7.1 GM/DL (ref 5.8–7.6)
RBC # BLD AUTO: 4.78 X10(6)/MCL (ref 4.2–5.4)
SODIUM SERPL-SCNC: 139 MMOL/L (ref 136–145)
TIBC SERPL-MCNC: 177 UG/DL (ref 70–310)
TIBC SERPL-MCNC: 286 UG/DL (ref 250–450)
VIT B12 SERPL-MCNC: 241 PG/ML (ref 213–816)
WBC # BLD AUTO: 9.66 X10(3)/MCL (ref 4.5–11.5)

## 2024-10-09 PROCEDURE — 83550 IRON BINDING TEST: CPT

## 2024-10-09 PROCEDURE — 3074F SYST BP LT 130 MM HG: CPT | Mod: CPTII,S$GLB,,

## 2024-10-09 PROCEDURE — 3078F DIAST BP <80 MM HG: CPT | Mod: CPTII,S$GLB,,

## 2024-10-09 PROCEDURE — 82746 ASSAY OF FOLIC ACID SERUM: CPT

## 2024-10-09 PROCEDURE — 83540 ASSAY OF IRON: CPT

## 2024-10-09 PROCEDURE — 82306 VITAMIN D 25 HYDROXY: CPT

## 2024-10-09 PROCEDURE — 1101F PT FALLS ASSESS-DOCD LE1/YR: CPT | Mod: CPTII,S$GLB,,

## 2024-10-09 PROCEDURE — 80053 COMPREHEN METABOLIC PANEL: CPT

## 2024-10-09 PROCEDURE — 85025 COMPLETE CBC W/AUTO DIFF WBC: CPT

## 2024-10-09 PROCEDURE — 82728 ASSAY OF FERRITIN: CPT

## 2024-10-09 PROCEDURE — 83735 ASSAY OF MAGNESIUM: CPT

## 2024-10-09 PROCEDURE — 1159F MED LIST DOCD IN RCRD: CPT | Mod: CPTII,S$GLB,,

## 2024-10-09 PROCEDURE — 36415 COLL VENOUS BLD VENIPUNCTURE: CPT

## 2024-10-09 PROCEDURE — 3008F BODY MASS INDEX DOCD: CPT | Mod: CPTII,S$GLB,,

## 2024-10-09 PROCEDURE — 1126F AMNT PAIN NOTED NONE PRSNT: CPT | Mod: CPTII,S$GLB,,

## 2024-10-09 PROCEDURE — 3288F FALL RISK ASSESSMENT DOCD: CPT | Mod: CPTII,S$GLB,,

## 2024-10-09 PROCEDURE — 99214 OFFICE O/P EST MOD 30 MIN: CPT | Mod: S$GLB,,,

## 2024-10-09 PROCEDURE — 1160F RVW MEDS BY RX/DR IN RCRD: CPT | Mod: CPTII,S$GLB,,

## 2024-10-09 PROCEDURE — 4010F ACE/ARB THERAPY RXD/TAKEN: CPT | Mod: CPTII,S$GLB,,

## 2024-10-09 PROCEDURE — 82607 VITAMIN B-12: CPT

## 2024-10-09 PROCEDURE — 99999 PR PBB SHADOW E&M-EST. PATIENT-LVL IV: CPT | Mod: PBBFAC,,,

## 2024-10-09 RX ORDER — CETIRIZINE HYDROCHLORIDE 10 MG/1
TABLET ORAL CONTINUOUS PRN
COMMUNITY

## 2024-10-09 NOTE — PROGRESS NOTES
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 )          SERENA Stephen  68 y.o.  female with past medical history significant for      Oncologic History Stage IA ([id number]), NVASIVE MUCINOUS CARCINOMA, GRADE 1 (0.8 CM), ER+99.5% MI+97.9% HER2-  DCIS, grade1    Oncologic Treatment ultrasound guided lumpectomy on 6/8/2022  treated with adjuvant XRT followed by AI       Pathology RIGHT BREAST LUMPECTOMY:     INVASIVE MUCINOUS CARCINOMA, LOYOLA-PHILLIPS GRADE 1 (0.8 CM).     CARCINOMA IS 1 MM FROM THE SUPERIOR MARGIN (NEW SUPERIOR MARGIN CLEAR, SEE SPECIMEN #3).     MINIMAL DUCTAL CARCINOMA IN SITU, LOW GRADE.           TUMOR        Histologic Type: Mucinous carcinoma        Histologic Grade (Daphne Histologic Score): [place] Score        Glandular (Acinar) / Tubular Differentiation: Score 1 (greater than 75% of tumor area forming glandular / tubular structures)        Nuclear Pleomorphism: Score 1 (Nuclei small with little increase in size in comparison with normal breast epithelial cells, regular outlines, uniform nuclear chromatin, little variation in size)        Mitotic Rate: Score 1        Overall Grade: Grade 1 (scores of 3, 4 or 5)        Tumor Size: Greatest dimension of largest invasive focus greater than 1 mm (specify exact measurement in Millimeters (mm)): 8 mm        Tumor Focality: Single focus of invasive carcinoma        Ductal Carcinoma In Situ (DCIS): Present        Ductal Carcinoma In Situ (DCIS): Negative for extensive intraductal component (EIC)        Nuclear  Grade: Grade I (low)        Lymphovascular Invasion: Not identified        Dermal Lymphovascular Invasion: No skin present        Treatment Effect in the Breast: No known presurgical therapy     MARGINS        Margin Status for Invasive Carcinoma: All margins negative for invasive carcinoma        Distance from Invasive Carcinoma to Closest Margin: Greater than: 10 mm        Closest Margin(s) to Invasive Carcinoma: Superior        Margin Status for DCIS: All margins negative for DCIS        Distance from DCIS to Closest Margin: Greater than: 10 mm        Closest Margin(s) to DCIS: Superior     REGIONAL LYMPH NODES        Regional Lymph Node Status: Regional lymph nodes present             All regional lymph nodes negative for tumor        Total Number of Lymph Nodes Examined (sentinel and non-sentinel): Exact number : 10        Number of [place] Nodes Examined: Exact number : 4       Interval History:   She returns to the clinic today for a four month follow-up regarding her history of right breast cancer, accompanied by her . She continues to take her anastrozole daily. She does note worsened fatigue. She does report hot flashes that are tolerable. She continues with joint pain, currently receiving injections and vaginal dryness, using Replens. Bilateral diagnostic mammo was completed 6/6/2024 which showed no evidence of malignancy. An annual follow-up was recommended. She denies any mood swings, headache, swelling, breast pain, and unintentional weight loss.    Past Medical History:   Diagnosis Date    Arthritis     Breast cancer     Disorder of thyroid, unspecified     DM (diabetes mellitus)     Generalized anxiety disorder     HTN (hypertension)     Mixed hyperlipidemia     PONV (postoperative nausea and vomiting)     Pulmonary nodules     Thalassemia minor       Past Surgical History:   Procedure Laterality Date    CHOLECYSTECTOMY      HYSTERECTOMY       Social History     Socioeconomic History     Marital status:    Tobacco Use    Smoking status: Never    Smokeless tobacco: Never   Substance and Sexual Activity    Alcohol use: Never    Drug use: Never      Family History   Problem Relation Name Age of Onset    Dementia Mother      Cancer Father      Thalassemia Sister      Diabetes Mellitus Brother      Asthma Brother        Review of patient's allergies indicates:   Allergen Reactions    Adhesive tape-silicones       Review of Systems   Constitutional:  Positive for fatigue (chronic). Negative for activity change, chills and fever.   HENT: Negative.     Eyes: Negative.    Respiratory:  Negative for cough and shortness of breath.    Cardiovascular:  Negative for chest pain and leg swelling.   Gastrointestinal:  Negative for abdominal distention, constipation, diarrhea, nausea and vomiting.   Genitourinary: Negative.  Positive for hot flashes.   Musculoskeletal:  Positive for arthralgias. Negative for myalgias.   Integumentary:  Negative.   Allergic/Immunologic: Negative.  Negative for immunocompromised state.   Neurological:  Negative for dizziness, weakness, light-headedness, numbness and headaches.   Hematological: Negative.  Negative for adenopathy. Does not bruise/bleed easily.   Psychiatric/Behavioral: Negative.           Objective:        Vitals:    10/09/24 1011   BP: 108/72   Pulse: 64   Resp: 16   Temp: 97.4 °F (36.3 °C)              Physical Exam  Constitutional:       General: She is not in acute distress.     Appearance: She is well-developed. She is not ill-appearing.   HENT:      Head: Normocephalic and atraumatic.      Mouth/Throat:      Mouth: Mucous membranes are moist.   Eyes:      Extraocular Movements: Extraocular movements intact.      Conjunctiva/sclera: Conjunctivae normal.   Cardiovascular:      Rate and Rhythm: Normal rate and regular rhythm.      Heart sounds: Normal heart sounds.   Pulmonary:      Effort: Pulmonary effort is normal. No respiratory distress.      Breath  sounds: Normal breath sounds. No wheezing.   Chest:      Comments: Breast exam deferred per patient request   Abdominal:      General: Bowel sounds are normal. There is no distension.      Palpations: Abdomen is soft.      Tenderness: There is no abdominal tenderness.   Musculoskeletal:         General: Normal range of motion.      Cervical back: Normal range of motion and neck supple.   Lymphadenopathy:      Cervical: No cervical adenopathy.      Upper Body:      Right upper body: No supraclavicular or axillary adenopathy.      Left upper body: No supraclavicular or axillary adenopathy.   Skin:     General: Skin is warm.   Neurological:      General: No focal deficit present.      Mental Status: She is alert and oriented to person, place, and time. Mental status is at baseline.      Cranial Nerves: No cranial nerve deficit.   Psychiatric:         Mood and Affect: Mood normal.           LABS / IMAGING      - 12/12/2022 mammogram:  There is an ovoid shaped mass with mostly smooth but partially obscured margins located in the right upper quadrant corresponding to a seroma associated with the surgical scar.  The margin has a somewhat irregularly thickened margin measuring 1-3 mm in thickness and there are focal areas of thin septations associated with the internal margin of this fluid collection.  It measures approximately 4.6 x 2.8 x 6.7 cm.  This shows a typical appearance of a postsurgical/post radiation seroma.  Recommend a repeat mammogram in 6 months    -5/15/2023 mammogram: there are scattered areas of fibroglandular density (density category B), there are postoperative changes of the right breast, there are no suspicious findings in either breast, benign findings- no evidence of malignancy, routine screening mammogram in 12 months     -6/6/2024 diagnostic bilateral mammogram: there are scattered areas of fibroglandular density (density category B). Changes related to surgery and radiation treatment in the right  "breast shoe a stable benign appearance. The seroma in the surgical scar in the right breast upper outer quadrant shows partially calcified rim that has decreased in volume over the past year. There are no new findings in either breast. There are no findings which are suspicious for malignancy; Benign findings-no evidence of malignancy (assessment category 2). Routine screening mammogram in 12 months     PATHOLOGY   - 06/19/2022: Mucinous carcinoma; Grade 1; 8 mm; LN 0/10, ER 99%, CA 97%, HER2 1+,     ONCOTYPE Dx:  07/18/2022:  Proprietary recurrent score of 9, distance recurrence at 9 year bella at 3% with antihormone treatment, and benefit from chemotherapy less than 1%.    Assessment:     ECOG Performance status: 0      Stage IA ([id number]), INVASIVE MUCINOUS CARCINOMA, GRADE 1 (0.8 CM), ER+99.5% CA+97.9% HER2- DCIS, grade1  ultrasound guided lumpectomy on 6/8/2022  s/p adjuvant XRT - 08/2022.    Aromasin 1mg daily started 1/28/2023  Tolerating well, without any significant side effects. She does report mild, occasional hot flashes. Continue therapy at this time.  Mammogram   Bilateral diagnostic mammogram completed 05/15/2023 showed no evidence of malignancy.  Routine screening mammogram recommended in 12 months.  Mammogram on 6/6/2024 was benign, next due 6/2025  Bone density  DEXA completed 1/27/2023 showed normal bone density to both spine and hip.   FU DEXA due 1/28/2025.  Currently only taking vitamin-D supplement due to history of elevated calcium.  Anemia   Hx of thalassemia, managed through Dr. Chun. She does report that she "takes oral iron when needed". Iron studies are stable.   10/9/2024 Instructed to stop oral iron, Ferritin elevated at 649.22  Vaginal dryness  Replens daily as well as lubricants as needed.        Plan:   Labs and exam stable   Continue Anastrozole daily.  Continue Vitamin D   Stop oral iron, ferritin elevated   Annual mammo due for 6/2025.  DEXA due 1/28/2024, ordered today   RTC in 4 " months with NP for FU/lab/CBE  CBC CMP Iron/TIBC Ferritin Vit B12 folate       The patient was seen, interviewed and examined. Pertinent lab and radiology studies were reviewed. Pt instructed to call should develop concerning signs/symptoms or have further questions.       Fani Gates, FNP-C  Oncology/Hematology  [facility]

## 2024-10-14 DIAGNOSIS — M80.08XA AGE-RELATED OSTEOPOROSIS WITH CURRENT PATHOLOGICAL FRACTURE, VERTEBRA(E), INITIAL ENCOUNTER FOR FRACTURE: Primary | ICD-10-CM

## 2025-01-30 ENCOUNTER — HOSPITAL ENCOUNTER (OUTPATIENT)
Dept: RADIOLOGY | Facility: HOSPITAL | Age: 70
Discharge: HOME | End: 2025-01-30
Payer: MEDICARE

## 2025-01-30 DIAGNOSIS — M80.08XA AGE-RELATED OSTEOPOROSIS WITH CURRENT PATHOLOGICAL FRACTURE, VERTEBRA(E), INITIAL ENCOUNTER FOR FRACTURE: ICD-10-CM

## 2025-01-30 PROCEDURE — 77080 DXA BONE DENSITY AXIAL: CPT | Mod: TC

## 2025-02-10 ENCOUNTER — OFFICE VISIT (OUTPATIENT)
Dept: HEMATOLOGY/ONCOLOGY | Facility: CLINIC | Age: 70
End: 2025-02-10
Payer: MEDICARE

## 2025-02-10 ENCOUNTER — LAB VISIT (OUTPATIENT)
Dept: LAB | Facility: HOSPITAL | Age: 70
End: 2025-02-10
Payer: MEDICARE

## 2025-02-10 VITALS
TEMPERATURE: 98 F | WEIGHT: 195 LBS | RESPIRATION RATE: 18 BRPM | SYSTOLIC BLOOD PRESSURE: 116 MMHG | HEIGHT: 63 IN | DIASTOLIC BLOOD PRESSURE: 74 MMHG | HEART RATE: 66 BPM | BODY MASS INDEX: 34.55 KG/M2 | OXYGEN SATURATION: 100 %

## 2025-02-10 DIAGNOSIS — Z17.0 MALIGNANT NEOPLASM OF RIGHT BREAST IN FEMALE, ESTROGEN RECEPTOR POSITIVE, UNSPECIFIED SITE OF BREAST: ICD-10-CM

## 2025-02-10 DIAGNOSIS — Z12.31 BREAST CANCER SCREENING BY MAMMOGRAM: ICD-10-CM

## 2025-02-10 DIAGNOSIS — C50.911 MALIGNANT NEOPLASM OF RIGHT BREAST IN FEMALE, ESTROGEN RECEPTOR POSITIVE, UNSPECIFIED SITE OF BREAST: Primary | ICD-10-CM

## 2025-02-10 DIAGNOSIS — R23.2 HOT FLASHES: ICD-10-CM

## 2025-02-10 DIAGNOSIS — Z17.0 ER+ (ESTROGEN RECEPTOR POSITIVE STATUS): ICD-10-CM

## 2025-02-10 DIAGNOSIS — R53.83 FATIGUE, UNSPECIFIED TYPE: ICD-10-CM

## 2025-02-10 DIAGNOSIS — Z17.0 MALIGNANT NEOPLASM OF UPPER-OUTER QUADRANT OF BREAST IN FEMALE, ESTROGEN RECEPTOR POSITIVE, UNSPECIFIED LATERALITY: ICD-10-CM

## 2025-02-10 DIAGNOSIS — D56.9 THALASSEMIA, UNSPECIFIED TYPE: ICD-10-CM

## 2025-02-10 DIAGNOSIS — Z79.811 LONG TERM CURRENT USE OF AROMATASE INHIBITOR: ICD-10-CM

## 2025-02-10 DIAGNOSIS — D50.9 MICROCYTIC ANEMIA: ICD-10-CM

## 2025-02-10 DIAGNOSIS — Z79.811 AROMATASE INHIBITOR USE: ICD-10-CM

## 2025-02-10 DIAGNOSIS — Z17.0 MALIGNANT NEOPLASM OF RIGHT BREAST IN FEMALE, ESTROGEN RECEPTOR POSITIVE, UNSPECIFIED SITE OF BREAST: Primary | ICD-10-CM

## 2025-02-10 DIAGNOSIS — C50.911 MALIGNANT NEOPLASM OF RIGHT BREAST IN FEMALE, ESTROGEN RECEPTOR POSITIVE, UNSPECIFIED SITE OF BREAST: ICD-10-CM

## 2025-02-10 DIAGNOSIS — C50.419 MALIGNANT NEOPLASM OF UPPER-OUTER QUADRANT OF BREAST IN FEMALE, ESTROGEN RECEPTOR POSITIVE, UNSPECIFIED LATERALITY: ICD-10-CM

## 2025-02-10 LAB
ALBUMIN SERPL-MCNC: 3.8 G/DL (ref 3.4–4.8)
ALBUMIN/GLOB SERPL: 1.1 RATIO (ref 1.1–2)
ALP SERPL-CCNC: 54 UNIT/L (ref 40–150)
ALT SERPL-CCNC: 10 UNIT/L (ref 0–55)
ANION GAP SERPL CALC-SCNC: 8 MEQ/L
AST SERPL-CCNC: 17 UNIT/L (ref 5–34)
BASOPHILS # BLD AUTO: 0.03 X10(3)/MCL
BASOPHILS NFR BLD AUTO: 0.4 %
BILIRUB SERPL-MCNC: 0.4 MG/DL
BUN SERPL-MCNC: 20 MG/DL (ref 9.8–20.1)
CALCIUM SERPL-MCNC: 10.5 MG/DL (ref 8.4–10.2)
CHLORIDE SERPL-SCNC: 108 MMOL/L (ref 98–107)
CO2 SERPL-SCNC: 25 MMOL/L (ref 23–31)
CREAT SERPL-MCNC: 0.78 MG/DL (ref 0.55–1.02)
CREAT/UREA NIT SERPL: 26
EOSINOPHIL # BLD AUTO: 0.07 X10(3)/MCL (ref 0–0.9)
EOSINOPHIL NFR BLD AUTO: 0.9 %
ERYTHROCYTE [DISTWIDTH] IN BLOOD BY AUTOMATED COUNT: 17 % (ref 11.5–17)
FERRITIN SERPL-MCNC: 591.4 NG/ML (ref 4.63–204)
FOLATE SERPL-MCNC: 7.9 NG/ML (ref 7–31.4)
GFR SERPLBLD CREATININE-BSD FMLA CKD-EPI: >60 ML/MIN/1.73/M2
GLOBULIN SER-MCNC: 3.6 GM/DL (ref 2.4–3.5)
GLUCOSE SERPL-MCNC: 139 MG/DL (ref 82–115)
HCT VFR BLD AUTO: 30.9 % (ref 37–47)
HGB BLD-MCNC: 9.5 G/DL (ref 12–16)
IMM GRANULOCYTES # BLD AUTO: 0.03 X10(3)/MCL (ref 0–0.04)
IMM GRANULOCYTES NFR BLD AUTO: 0.4 %
LYMPHOCYTES # BLD AUTO: 1.8 X10(3)/MCL (ref 0.6–4.6)
LYMPHOCYTES NFR BLD AUTO: 23.3 %
MCH RBC QN AUTO: 19.2 PG (ref 27–31)
MCHC RBC AUTO-ENTMCNC: 30.7 G/DL (ref 33–36)
MCV RBC AUTO: 62.4 FL (ref 80–94)
MONOCYTES # BLD AUTO: 0.54 X10(3)/MCL (ref 0.1–1.3)
MONOCYTES NFR BLD AUTO: 7 %
NEUTROPHILS # BLD AUTO: 5.26 X10(3)/MCL (ref 2.1–9.2)
NEUTROPHILS NFR BLD AUTO: 68 %
NRBC BLD AUTO-RTO: 0.3 %
PLATELET # BLD AUTO: 261 X10(3)/MCL (ref 130–400)
PMV BLD AUTO: 9.2 FL (ref 7.4–10.4)
POTASSIUM SERPL-SCNC: 4.4 MMOL/L (ref 3.5–5.1)
PROT SERPL-MCNC: 7.4 GM/DL (ref 5.8–7.6)
RBC # BLD AUTO: 4.95 X10(6)/MCL (ref 4.2–5.4)
SODIUM SERPL-SCNC: 141 MMOL/L (ref 136–145)
VIT B12 SERPL-MCNC: 203 PG/ML (ref 213–816)
WBC # BLD AUTO: 7.73 X10(3)/MCL (ref 4.5–11.5)

## 2025-02-10 PROCEDURE — 36415 COLL VENOUS BLD VENIPUNCTURE: CPT

## 2025-02-10 PROCEDURE — 82728 ASSAY OF FERRITIN: CPT

## 2025-02-10 PROCEDURE — 82746 ASSAY OF FOLIC ACID SERUM: CPT

## 2025-02-10 PROCEDURE — 80053 COMPREHEN METABOLIC PANEL: CPT

## 2025-02-10 PROCEDURE — 85025 COMPLETE CBC W/AUTO DIFF WBC: CPT

## 2025-02-10 PROCEDURE — 82607 VITAMIN B-12: CPT

## 2025-02-10 PROCEDURE — 99999 PR PBB SHADOW E&M-EST. PATIENT-LVL V: CPT | Mod: PBBFAC,,,

## 2025-02-10 RX ORDER — ANASTROZOLE 1 MG/1
1 TABLET ORAL DAILY
Qty: 30 TABLET | Refills: 5 | Status: SHIPPED | OUTPATIENT
Start: 2025-02-10 | End: 2026-02-10

## 2025-02-10 NOTE — PROGRESS NOTES
HEMATOLOGY / ONCOLOGY   CLINIC NOTE     ONCOLOGICAL HISTORY:     Diagnosis:  - Stage IA ([id number]), INVASIVE MUCINOUS CARCINOMA, GRADE 1 (0.8 CM), ER+99.5% LA+97.9% ; HER2 1+, DCIS, grade1    Treatment History:  - ultrasound guided lumpectomy on 6/8/2022  - adjuvant XRT followed by AI    Current Treatment:   Anastrozole 1mg daily  Started 1/28/2023    Subjective:       Chief Complaint: Malignant neoplasm of right breast in female, estrogen rece (Pt c/o occasional fatigue.)          HPI      Helen Stephen  69 y.o.  female with past medical history significant for      Oncologic History Stage IA ([id number]), NVASIVE MUCINOUS CARCINOMA, GRADE 1 (0.8 CM), ER+99.5% LA+97.9% HER2-  DCIS, grade1    Oncologic Treatment ultrasound guided lumpectomy on 6/8/2022  treated with adjuvant XRT followed by AI       Pathology RIGHT BREAST LUMPECTOMY:     INVASIVE MUCINOUS CARCINOMA, LOYOLA-PHILLIPS GRADE 1 (0.8 CM).     CARCINOMA IS 1 MM FROM THE SUPERIOR MARGIN (NEW SUPERIOR MARGIN CLEAR, SEE SPECIMEN #3).     MINIMAL DUCTAL CARCINOMA IN SITU, LOW GRADE.           TUMOR        Histologic Type: Mucinous carcinoma        Histologic Grade (Daphne Histologic Score): [place] Score        Glandular (Acinar) / Tubular Differentiation: Score 1 (greater than 75% of tumor area forming glandular / tubular structures)        Nuclear Pleomorphism: Score 1 (Nuclei small with little increase in size in comparison with normal breast epithelial cells, regular outlines, uniform nuclear chromatin, little variation in size)        Mitotic Rate: Score 1        Overall Grade: Grade 1 (scores of 3, 4 or 5)        Tumor Size: Greatest dimension of largest invasive focus greater than 1 mm (specify exact measurement in Millimeters (mm)): 8 mm        Tumor Focality: Single focus of invasive carcinoma        Ductal Carcinoma In Situ (DCIS): Present        Ductal Carcinoma In Situ (DCIS): Negative for extensive intraductal component (EIC)         Nuclear Grade: Grade I (low)        Lymphovascular Invasion: Not identified        Dermal Lymphovascular Invasion: No skin present        Treatment Effect in the Breast: No known presurgical therapy     MARGINS        Margin Status for Invasive Carcinoma: All margins negative for invasive carcinoma        Distance from Invasive Carcinoma to Closest Margin: Greater than: 10 mm        Closest Margin(s) to Invasive Carcinoma: Superior        Margin Status for DCIS: All margins negative for DCIS        Distance from DCIS to Closest Margin: Greater than: 10 mm        Closest Margin(s) to DCIS: Superior     REGIONAL LYMPH NODES        Regional Lymph Node Status: Regional lymph nodes present             All regional lymph nodes negative for tumor        Total Number of Lymph Nodes Examined (sentinel and non-sentinel): Exact number : 10        Number of [place] Nodes Examined: Exact number : 4       Interval History:   She returns to the clinic today for a four month follow-up regarding her history of right breast cancer, accompanied by her . She continues to take her anastrozole daily. She does note worsened fatigue. She does report hot flashes that are tolerable. Joint pain has resolved. She continues with vaginal dryness controlled with Replens. Bilateral diagnostic mammo was completed 6/6/2024 which showed no evidence of malignancy. An annual follow-up was recommended. DEXA completed 1/30/2025 showed normal bone density. She is losing weight due to being on monjaro. She denies any mood swings, headache, swelling, breast pain, and unintentional weight loss.    Past Medical History:   Diagnosis Date    Arthritis     Breast cancer     Disorder of thyroid, unspecified     DM (diabetes mellitus)     Generalized anxiety disorder     HTN (hypertension)     Mixed hyperlipidemia     PONV (postoperative nausea and vomiting)     Pulmonary nodules     Thalassemia minor       Past Surgical History:   Procedure Laterality  Date    CHOLECYSTECTOMY      HYSTERECTOMY       Social History     Socioeconomic History    Marital status:    Tobacco Use    Smoking status: Never    Smokeless tobacco: Never   Substance and Sexual Activity    Alcohol use: Never    Drug use: Never      Family History   Problem Relation Name Age of Onset    Dementia Mother      Cancer Father      Thalassemia Sister      Diabetes Mellitus Brother      Asthma Brother        Review of patient's allergies indicates:   Allergen Reactions    Adhesive tape-silicones       Review of Systems   Constitutional:  Positive for fatigue (chronic). Negative for activity change, chills and fever.   HENT: Negative.     Eyes: Negative.    Respiratory:  Negative for cough and shortness of breath.    Cardiovascular:  Negative for chest pain and leg swelling.   Gastrointestinal:  Negative for abdominal distention, constipation, diarrhea, nausea and vomiting.   Genitourinary: Negative.  Positive for hot flashes.   Musculoskeletal:  Negative for arthralgias and myalgias.   Integumentary:  Negative.   Allergic/Immunologic: Negative.  Negative for immunocompromised state.   Neurological:  Negative for dizziness, weakness, light-headedness, numbness and headaches.   Hematological: Negative.  Negative for adenopathy. Does not bruise/bleed easily.   Psychiatric/Behavioral: Negative.           Objective:        Vitals:    02/10/25 1253   BP: 116/74   Pulse: 66   Resp: 18   Temp: 97.6 °F (36.4 °C)                Physical Exam  Constitutional:       General: She is not in acute distress.     Appearance: She is well-developed. She is not ill-appearing.   HENT:      Head: Normocephalic and atraumatic.      Mouth/Throat:      Mouth: Mucous membranes are moist.   Eyes:      Extraocular Movements: Extraocular movements intact.      Conjunctiva/sclera: Conjunctivae normal.   Cardiovascular:      Rate and Rhythm: Normal rate and regular rhythm.      Heart sounds: Normal heart sounds.   Pulmonary:       Effort: Pulmonary effort is normal. No respiratory distress.      Breath sounds: Normal breath sounds. No wheezing.   Chest:   Breasts:     Right: Normal.      Left: Normal.      Comments: Well healed right breast lumpectomy.   Abdominal:      General: Bowel sounds are normal. There is no distension.      Palpations: Abdomen is soft.      Tenderness: There is no abdominal tenderness.   Musculoskeletal:         General: Normal range of motion.      Cervical back: Normal range of motion and neck supple.   Lymphadenopathy:      Cervical: No cervical adenopathy.      Upper Body:      Right upper body: No supraclavicular or axillary adenopathy.      Left upper body: No supraclavicular or axillary adenopathy.   Skin:     General: Skin is warm.   Neurological:      General: No focal deficit present.      Mental Status: She is alert and oriented to person, place, and time. Mental status is at baseline.      Cranial Nerves: No cranial nerve deficit.   Psychiatric:         Mood and Affect: Mood normal.           LABS / IMAGING      - 12/12/2022 mammogram:  There is an ovoid shaped mass with mostly smooth but partially obscured margins located in the right upper quadrant corresponding to a seroma associated with the surgical scar.  The margin has a somewhat irregularly thickened margin measuring 1-3 mm in thickness and there are focal areas of thin septations associated with the internal margin of this fluid collection.  It measures approximately 4.6 x 2.8 x 6.7 cm.  This shows a typical appearance of a postsurgical/post radiation seroma.  Recommend a repeat mammogram in 6 months    -5/15/2023 mammogram: there are scattered areas of fibroglandular density (density category B), there are postoperative changes of the right breast, there are no suspicious findings in either breast, benign findings- no evidence of malignancy, routine screening mammogram in 12 months     -6/6/2024 diagnostic bilateral mammogram: there are  "scattered areas of fibroglandular density (density category B). Changes related to surgery and radiation treatment in the right breast shoe a stable benign appearance. The seroma in the surgical scar in the right breast upper outer quadrant shows partially calcified rim that has decreased in volume over the past year. There are no new findings in either breast. There are no findings which are suspicious for malignancy; Benign findings-no evidence of malignancy (assessment category 2). Routine screening mammogram in 12 months     PATHOLOGY   - 06/19/2022: Mucinous carcinoma; Grade 1; 8 mm; LN 0/10, ER 99%, UT 97%, HER2 1+,     ONCOTYPE Dx:  07/18/2022:  Proprietary recurrent score of 9, distance recurrence at 9 year bella at 3% with antihormone treatment, and benefit from chemotherapy less than 1%.    Assessment:     ECOG Performance status: 0      Stage IA ([id number]), INVASIVE MUCINOUS CARCINOMA, GRADE 1 (0.8 CM), ER+99.5% UT+97.9% HER2- DCIS, grade1  ultrasound guided lumpectomy on 6/8/2022  s/p adjuvant XRT - 08/2022.    Aromasin 1mg daily started 1/28/2023  Tolerating well, without any significant side effects. She does report mild, occasional hot flashes. Continue therapy at this time.  Mammogram   Bilateral diagnostic mammogram completed 05/15/2023 showed no evidence of malignancy.  Routine screening mammogram recommended in 12 months.  Mammogram on 6/6/2024 was benign, next due 6/2025. Ordered today.  Bone density  DEXA completed 1/30/2025 showed normal bone density to both spine and hip.   FU DEXA due 1/30/2027.  Currently only taking vitamin-D supplement due to history of elevated calcium.  Anemia   Hx of thalassemia, managed through Dr. Chun. She does report that she "takes oral iron when needed". Iron studies are stable.   10/9/2024 Instructed to stop oral iron, Ferritin elevated at 649.22  Continue to hold today, but was instructed to start oral B12 for level of 241 on 10/9/2024  Vaginal dryness  Replens " daily as well as lubricants as needed.        Plan:   Labs and exam stable   Continue Anastrozole daily.  Continue Vitamin D   Start daily B12.  Annual mammo due for 6/2025. Ordered today.   DEXA due 1/30/2027  RTC in 6 months with NP for FU/lab/CBE  CBC CMP Iron/TIBC Ferritin Vit B12 folate       The patient was seen, interviewed and examined. Pertinent lab and radiology studies were reviewed. Pt instructed to call should develop concerning signs/symptoms or have further questions.       JANETH PetersonP-C  Oncology/Hematology   [facility] personally reviewed all pertinent imaging, lab results, explained to the patient the pertinent information in detail including radiographic imaging, abnormal lab results. All questions were answered thoroughly. Total time spent on this visit was >40 minutes.

## 2025-08-11 ENCOUNTER — OFFICE VISIT (OUTPATIENT)
Facility: CLINIC | Age: 70
End: 2025-08-11
Payer: MEDICARE

## 2025-08-11 ENCOUNTER — LAB VISIT (OUTPATIENT)
Dept: LAB | Facility: HOSPITAL | Age: 70
End: 2025-08-11
Payer: MEDICARE

## 2025-08-11 VITALS
DIASTOLIC BLOOD PRESSURE: 64 MMHG | HEART RATE: 65 BPM | RESPIRATION RATE: 20 BRPM | BODY MASS INDEX: 34.23 KG/M2 | HEIGHT: 63 IN | OXYGEN SATURATION: 100 % | TEMPERATURE: 97 F | SYSTOLIC BLOOD PRESSURE: 133 MMHG | WEIGHT: 193.19 LBS

## 2025-08-11 DIAGNOSIS — C50.911 MALIGNANT NEOPLASM OF RIGHT BREAST IN FEMALE, ESTROGEN RECEPTOR POSITIVE, UNSPECIFIED SITE OF BREAST: ICD-10-CM

## 2025-08-11 DIAGNOSIS — Z79.811 AROMATASE INHIBITOR USE: ICD-10-CM

## 2025-08-11 DIAGNOSIS — Z17.0 MALIGNANT NEOPLASM OF RIGHT BREAST IN FEMALE, ESTROGEN RECEPTOR POSITIVE, UNSPECIFIED SITE OF BREAST: Primary | ICD-10-CM

## 2025-08-11 DIAGNOSIS — C50.911 MALIGNANT NEOPLASM OF RIGHT BREAST IN FEMALE, ESTROGEN RECEPTOR POSITIVE, UNSPECIFIED SITE OF BREAST: Primary | ICD-10-CM

## 2025-08-11 DIAGNOSIS — D56.9 THALASSEMIA, UNSPECIFIED TYPE: ICD-10-CM

## 2025-08-11 DIAGNOSIS — R23.2 HOT FLASHES: ICD-10-CM

## 2025-08-11 DIAGNOSIS — R53.83 FATIGUE, UNSPECIFIED TYPE: ICD-10-CM

## 2025-08-11 DIAGNOSIS — D50.9 MICROCYTIC ANEMIA: ICD-10-CM

## 2025-08-11 DIAGNOSIS — Z79.811 LONG TERM CURRENT USE OF AROMATASE INHIBITOR: ICD-10-CM

## 2025-08-11 DIAGNOSIS — Z17.0 MALIGNANT NEOPLASM OF RIGHT BREAST IN FEMALE, ESTROGEN RECEPTOR POSITIVE, UNSPECIFIED SITE OF BREAST: ICD-10-CM

## 2025-08-11 LAB
25(OH)D3+25(OH)D2 SERPL-MCNC: 57 NG/ML (ref 30–80)
ALBUMIN SERPL-MCNC: 3.6 G/DL (ref 3.4–4.8)
ALBUMIN/GLOB SERPL: 1 RATIO (ref 1.1–2)
ALP SERPL-CCNC: 57 UNIT/L (ref 40–150)
ALT SERPL-CCNC: 9 UNIT/L (ref 0–55)
ANION GAP SERPL CALC-SCNC: 9 MEQ/L
AST SERPL-CCNC: 14 UNIT/L (ref 11–45)
BASOPHILS # BLD AUTO: 0.03 X10(3)/MCL
BASOPHILS NFR BLD AUTO: 0.4 %
BILIRUB SERPL-MCNC: 0.5 MG/DL
BUN SERPL-MCNC: 17 MG/DL (ref 9.8–20.1)
CALCIUM SERPL-MCNC: 9.7 MG/DL (ref 8.4–10.2)
CHLORIDE SERPL-SCNC: 104 MMOL/L (ref 98–107)
CO2 SERPL-SCNC: 27 MMOL/L (ref 23–31)
CREAT SERPL-MCNC: 0.8 MG/DL (ref 0.55–1.02)
CREAT/UREA NIT SERPL: 21
EOSINOPHIL # BLD AUTO: 0.1 X10(3)/MCL (ref 0–0.9)
EOSINOPHIL NFR BLD AUTO: 1.2 %
ERYTHROCYTE [DISTWIDTH] IN BLOOD BY AUTOMATED COUNT: 16.7 % (ref 11.5–17)
FERRITIN SERPL-MCNC: 593.36 NG/ML (ref 4.63–204)
FOLATE SERPL-MCNC: 12.2 NG/ML (ref 7–31.4)
GFR SERPLBLD CREATININE-BSD FMLA CKD-EPI: >60 ML/MIN/1.73/M2
GLOBULIN SER-MCNC: 3.6 GM/DL (ref 2.4–3.5)
GLUCOSE SERPL-MCNC: 180 MG/DL (ref 82–115)
HCT VFR BLD AUTO: 31.7 % (ref 37–47)
HGB BLD-MCNC: 9.5 G/DL (ref 12–16)
IMM GRANULOCYTES # BLD AUTO: 0.03 X10(3)/MCL (ref 0–0.04)
IMM GRANULOCYTES NFR BLD AUTO: 0.4 %
IRON SATN MFR SERPL: 26 % (ref 20–50)
IRON SERPL-MCNC: 71 UG/DL (ref 50–170)
LYMPHOCYTES # BLD AUTO: 2.15 X10(3)/MCL (ref 0.6–4.6)
LYMPHOCYTES NFR BLD AUTO: 26.3 %
MCH RBC QN AUTO: 19.2 PG (ref 27–31)
MCHC RBC AUTO-ENTMCNC: 30 G/DL (ref 33–36)
MCV RBC AUTO: 64 FL (ref 80–94)
MONOCYTES # BLD AUTO: 0.59 X10(3)/MCL (ref 0.1–1.3)
MONOCYTES NFR BLD AUTO: 7.2 %
NEUTROPHILS # BLD AUTO: 5.29 X10(3)/MCL (ref 2.1–9.2)
NEUTROPHILS NFR BLD AUTO: 64.5 %
NRBC BLD AUTO-RTO: 0.2 %
PLATELET # BLD AUTO: 248 X10(3)/MCL (ref 130–400)
PMV BLD AUTO: 9 FL (ref 7.4–10.4)
POTASSIUM SERPL-SCNC: 4.2 MMOL/L (ref 3.5–5.1)
PROT SERPL-MCNC: 7.2 GM/DL (ref 5.8–7.6)
RBC # BLD AUTO: 4.95 X10(6)/MCL (ref 4.2–5.4)
SODIUM SERPL-SCNC: 140 MMOL/L (ref 136–145)
TIBC SERPL-MCNC: 202 UG/DL (ref 70–310)
TIBC SERPL-MCNC: 273 UG/DL (ref 250–450)
VIT B12 SERPL-MCNC: 358 PG/ML (ref 213–816)
WBC # BLD AUTO: 8.19 X10(3)/MCL (ref 4.5–11.5)

## 2025-08-11 PROCEDURE — 4010F ACE/ARB THERAPY RXD/TAKEN: CPT | Mod: CPTII,S$GLB,,

## 2025-08-11 PROCEDURE — 3288F FALL RISK ASSESSMENT DOCD: CPT | Mod: CPTII,S$GLB,,

## 2025-08-11 PROCEDURE — 82306 VITAMIN D 25 HYDROXY: CPT

## 2025-08-11 PROCEDURE — 83550 IRON BINDING TEST: CPT

## 2025-08-11 PROCEDURE — 82728 ASSAY OF FERRITIN: CPT

## 2025-08-11 PROCEDURE — 82607 VITAMIN B-12: CPT

## 2025-08-11 PROCEDURE — 3075F SYST BP GE 130 - 139MM HG: CPT | Mod: CPTII,S$GLB,,

## 2025-08-11 PROCEDURE — 80053 COMPREHEN METABOLIC PANEL: CPT

## 2025-08-11 PROCEDURE — 3078F DIAST BP <80 MM HG: CPT | Mod: CPTII,S$GLB,,

## 2025-08-11 PROCEDURE — 99999 PR PBB SHADOW E&M-EST. PATIENT-LVL IV: CPT | Mod: PBBFAC,,,

## 2025-08-11 PROCEDURE — 36415 COLL VENOUS BLD VENIPUNCTURE: CPT

## 2025-08-11 PROCEDURE — 1160F RVW MEDS BY RX/DR IN RCRD: CPT | Mod: CPTII,S$GLB,,

## 2025-08-11 PROCEDURE — 1126F AMNT PAIN NOTED NONE PRSNT: CPT | Mod: CPTII,S$GLB,,

## 2025-08-11 PROCEDURE — 85025 COMPLETE CBC W/AUTO DIFF WBC: CPT

## 2025-08-11 PROCEDURE — 3008F BODY MASS INDEX DOCD: CPT | Mod: CPTII,S$GLB,,

## 2025-08-11 PROCEDURE — 1101F PT FALLS ASSESS-DOCD LE1/YR: CPT | Mod: CPTII,S$GLB,,

## 2025-08-11 PROCEDURE — 1159F MED LIST DOCD IN RCRD: CPT | Mod: CPTII,S$GLB,,

## 2025-08-11 PROCEDURE — 99215 OFFICE O/P EST HI 40 MIN: CPT | Mod: S$GLB,,,

## 2025-08-11 PROCEDURE — 82746 ASSAY OF FOLIC ACID SERUM: CPT

## 2025-08-11 RX ORDER — TIRZEPATIDE 7.5 MG/.5ML
INJECTION, SOLUTION SUBCUTANEOUS
COMMUNITY
Start: 2025-06-27

## 2025-08-11 RX ORDER — FOLIC ACID 1 MG/1
1000 TABLET ORAL
COMMUNITY
Start: 2025-08-01

## (undated) DEVICE — SPONGE LAP STRL 18X18IN

## (undated) DEVICE — DRESSING TELFA N ADH 3X8IN

## (undated) DEVICE — SUT MCRYL PLUS 4-0 PS2 27IN

## (undated) DEVICE — APPLIER CLIP LIAGCLIP 9.375IN

## (undated) DEVICE — SUT 2/0 30IN SILK BLK BRAI

## (undated) DEVICE — CLIP LIGATING MEDIUM

## (undated) DEVICE — PENCIL ELECSURG ROCKER 15FT

## (undated) DEVICE — GLOVE PROTEXIS LTX MICRO  7

## (undated) DEVICE — NDL HYPO REG 25G X 1 1/2

## (undated) DEVICE — WIRE DUALOK BRST LSN 20GX10.7

## (undated) DEVICE — SYR 10CC LUER LOCK

## (undated) DEVICE — DRESSING TRANS 4X4 TEGADERM

## (undated) DEVICE — SOL NACL IRR 1000ML BTL

## (undated) DEVICE — ELECTRODE PATIENT RETURN DISP

## (undated) DEVICE — GLOVE PROTEXIS HYDROGEL SZ6.5

## (undated) DEVICE — GLOVE PROTEXIS BLUE LATEX 7

## (undated) DEVICE — Device

## (undated) DEVICE — ELECTRODE BLADE E-Z CLEAN 4IN

## (undated) DEVICE — SUT VICRYL PLUS 3-0 SH 18IN

## (undated) DEVICE — ADHESIVE DERMABOND ADVANCED

## (undated) DEVICE — GAUZE SPONGE 4X4 12PLY